# Patient Record
Sex: FEMALE | Race: WHITE | NOT HISPANIC OR LATINO | Employment: FULL TIME | URBAN - METROPOLITAN AREA
[De-identification: names, ages, dates, MRNs, and addresses within clinical notes are randomized per-mention and may not be internally consistent; named-entity substitution may affect disease eponyms.]

---

## 2024-05-03 ENCOUNTER — PATIENT MESSAGE (OUTPATIENT)
Dept: ADMINISTRATIVE | Facility: OTHER | Age: 29
End: 2024-05-03
Payer: COMMERCIAL

## 2024-07-11 ENCOUNTER — TELEPHONE (OUTPATIENT)
Dept: OBSTETRICS AND GYNECOLOGY | Facility: CLINIC | Age: 29
End: 2024-07-11
Payer: COMMERCIAL

## 2024-07-11 NOTE — TELEPHONE ENCOUNTER
Spoke with patient. Assisted patient to schedule appointment. Patient states she will call back. Patient verbalized understanding.

## 2024-07-24 ENCOUNTER — OFFICE VISIT (OUTPATIENT)
Dept: OBSTETRICS AND GYNECOLOGY | Facility: CLINIC | Age: 29
End: 2024-07-24
Payer: COMMERCIAL

## 2024-07-24 VITALS
WEIGHT: 148.38 LBS | BODY MASS INDEX: 22.49 KG/M2 | HEIGHT: 68 IN | DIASTOLIC BLOOD PRESSURE: 70 MMHG | SYSTOLIC BLOOD PRESSURE: 115 MMHG

## 2024-07-24 DIAGNOSIS — Z34.00 SUPERVISION OF NORMAL FIRST PREGNANCY, ANTEPARTUM: ICD-10-CM

## 2024-07-24 DIAGNOSIS — Z3A.28 28 WEEKS GESTATION OF PREGNANCY: Primary | ICD-10-CM

## 2024-07-24 PROCEDURE — 99999 PR PBB SHADOW E&M-EST. PATIENT-LVL III: CPT | Mod: PBBFAC,,, | Performed by: ADVANCED PRACTICE MIDWIFE

## 2024-07-24 NOTE — PROGRESS NOTES
Subjective     Patient ID: Katelyn Pritchard is a 28 y.o. female.    Chief Complaint: Possible Pregnancy    Presents today to transfer care as is interested in NCB  Records, labs and ultrasounds visible in chart and reviewed.  Anatomy scan was incomplete at 19 weeks  Having a girl  Planned pregnancy. 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   Cardiovascular:  Positive for palpitations.        Is following  States only when tired or stressed  Declines Holter monitor at present   All other systems reviewed and are negative.         Objective     Physical Exam  Vitals reviewed.   Constitutional:       Appearance: Normal appearance.   Pulmonary:      Effort: Pulmonary effort is normal.   Abdominal:      Palpations: Abdomen is soft.      Comments: FH 22  FHT's 154   Genitourinary:     Comments: deferred  Musculoskeletal:         General: Normal range of motion.   Skin:     General: Skin is warm and dry.   Neurological:      General: No focal deficit present.      Mental Status: She is alert and oriented to person, place, and time. Mental status is at baseline.   Psychiatric:         Mood and Affect: Mood normal.         Behavior: Behavior normal.         Thought Content: Thought content normal.         Judgment: Judgment normal.            Assessment and Plan     1. 28 weeks gestation of pregnancy  -     CBC Auto Differential; Future; Expected date: 07/24/2024  -     Treponema Pallidium Antibodies IgG, IgM; Future; Expected date: 07/24/2024  -     HIV 1/2 Ag/Ab (4th Gen); Future; Expected date: 07/24/2024  -     OB Glucose Screen; Future; Expected date: 07/24/2024  -     US OB/GYN Procedure ([facility])-Future; Future    2. Supervision of normal first pregnancy, antepartum      RTC 4 weeks with T3 labs  Warning S/S reviewed  A-Z book and LND contact # provided  Oriented to [facility]           No follow-ups on  file.

## 2024-07-30 ENCOUNTER — TELEPHONE (OUTPATIENT)
Dept: OBSTETRICS AND GYNECOLOGY | Facility: CLINIC | Age: 29
End: 2024-07-30
Payer: COMMERCIAL

## 2024-07-30 NOTE — TELEPHONE ENCOUNTER
----- Message from Fabiola Washington sent at 7/30/2024 11:55 AM CDT -----  Contact: Hope  Patient is calling in regards to paperwork. 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 Pt needing letter sent via [employer]. For any further questions/concerns, pt can be reached at .[phone].

## 2024-08-21 ENCOUNTER — LAB VISIT (OUTPATIENT)
Dept: LAB | Facility: HOSPITAL | Age: 29
End: 2024-08-21
Attending: ADVANCED PRACTICE MIDWIFE
Payer: COMMERCIAL

## 2024-08-21 ENCOUNTER — PROCEDURE VISIT (OUTPATIENT)
Dept: OBSTETRICS AND GYNECOLOGY | Facility: CLINIC | Age: 29
End: 2024-08-21
Payer: COMMERCIAL

## 2024-08-21 ENCOUNTER — ROUTINE PRENATAL (OUTPATIENT)
Dept: OBSTETRICS AND GYNECOLOGY | Facility: CLINIC | Age: 29
End: 2024-08-21
Payer: COMMERCIAL

## 2024-08-21 VITALS — WEIGHT: 153 LBS | BODY MASS INDEX: 23.26 KG/M2 | SYSTOLIC BLOOD PRESSURE: 110 MMHG | DIASTOLIC BLOOD PRESSURE: 70 MMHG

## 2024-08-21 DIAGNOSIS — Z3A.28 28 WEEKS GESTATION OF PREGNANCY: ICD-10-CM

## 2024-08-21 DIAGNOSIS — Z3A.27 27 WEEKS GESTATION OF PREGNANCY: ICD-10-CM

## 2024-08-21 DIAGNOSIS — Z34.00 SUPERVISION OF NORMAL FIRST PREGNANCY, ANTEPARTUM: Primary | ICD-10-CM

## 2024-08-21 LAB
BASOPHILS # BLD AUTO: 0.09 K/UL (ref 0–0.2)
BASOPHILS NFR BLD: 1 % (ref 0–1.9)
DIFFERENTIAL METHOD BLD: ABNORMAL
EOSINOPHIL # BLD AUTO: 0.3 K/UL (ref 0–0.5)
EOSINOPHIL NFR BLD: 2.9 % (ref 0–8)
ERYTHROCYTE [DISTWIDTH] IN BLOOD BY AUTOMATED COUNT: 12.5 % (ref 11.5–14.5)
GLUCOSE SERPL-MCNC: 68 MG/DL (ref 70–140)
HCT VFR BLD AUTO: 39.5 % (ref 37–48.5)
HGB BLD-MCNC: 12.9 G/DL (ref 12–16)
HIV 1+2 AB+HIV1 P24 AG SERPL QL IA: NORMAL
IMM GRANULOCYTES # BLD AUTO: 0.03 K/UL (ref 0–0.04)
IMM GRANULOCYTES NFR BLD AUTO: 0.3 % (ref 0–0.5)
LYMPHOCYTES # BLD AUTO: 1.3 K/UL (ref 1–4.8)
LYMPHOCYTES NFR BLD: 14.6 % (ref 18–48)
MCH RBC QN AUTO: 33 PG (ref 27–31)
MCHC RBC AUTO-ENTMCNC: 32.7 G/DL (ref 32–36)
MCV RBC AUTO: 101 FL (ref 82–98)
MONOCYTES # BLD AUTO: 0.6 K/UL (ref 0.3–1)
MONOCYTES NFR BLD: 6.9 % (ref 4–15)
NEUTROPHILS # BLD AUTO: 6.6 K/UL (ref 1.8–7.7)
NEUTROPHILS NFR BLD: 74.3 % (ref 38–73)
NRBC BLD-RTO: 0 /100 WBC
PLATELET # BLD AUTO: 236 K/UL (ref 150–450)
PMV BLD AUTO: 11.6 FL (ref 9.2–12.9)
RBC # BLD AUTO: 3.91 M/UL (ref 4–5.4)
TREPONEMA PALLIDUM IGG+IGM AB [PRESENCE] IN SERUM OR PLASMA BY IMMUNOASSAY: NONREACTIVE
WBC # BLD AUTO: 8.89 K/UL (ref 3.9–12.7)

## 2024-08-21 PROCEDURE — 99999 PR PBB SHADOW E&M-EST. PATIENT-LVL III: CPT | Mod: PBBFAC,,, | Performed by: MIDWIFE

## 2024-08-21 PROCEDURE — 82950 GLUCOSE TEST: CPT | Performed by: ADVANCED PRACTICE MIDWIFE

## 2024-08-21 PROCEDURE — 87389 HIV-1 AG W/HIV-1&-2 AB AG IA: CPT | Performed by: ADVANCED PRACTICE MIDWIFE

## 2024-08-21 PROCEDURE — 85025 COMPLETE CBC W/AUTO DIFF WBC: CPT | Performed by: ADVANCED PRACTICE MIDWIFE

## 2024-08-21 PROCEDURE — 36415 COLL VENOUS BLD VENIPUNCTURE: CPT | Performed by: ADVANCED PRACTICE MIDWIFE

## 2024-08-21 PROCEDURE — 0502F SUBSEQUENT PRENATAL CARE: CPT | Mod: CPTII,S$GLB,, | Performed by: MIDWIFE

## 2024-08-21 PROCEDURE — 86593 SYPHILIS TEST NON-TREP QUANT: CPT | Performed by: ADVANCED PRACTICE MIDWIFE

## 2024-08-21 NOTE — PROGRESS NOTES
28 y.o. female  at 27w2d   Reports + FM, denies VB, LOF or CTX  Doing well without concerns, had some questions about cramping, warning signs discussed, reviewed kick counts  TW lbs   28wk labs today (A) pos  Tdap info given  Will start visits every 2 weeks  Pt lives in [place], appt made w/ [facility] today for f/u anatomy, appears complete, EFW 49%, MVP 6.9, BREECH  Supervision of normal first pregnancy, antepartum    27 weeks gestation of pregnancy    Reviewed warning signs, normal FKCs,  labor precautions and how/when to call.  RTC x 2 wks, call or present sooner prn.

## 2024-08-21 NOTE — PATIENT INSTRUCTIONS
"  Frequently Asked Questions for Pregnant Women Concerning Tdap Vaccination    What is pertussis (whooping cough)?  Pertussis (also called whooping cough) is a highly contagious disease that causes severe coughing. People with pertussis may make a "whooping" sound when they try to breathe and gasp for air. In newborns (birth to 1 month), pertussis can be life threatening. Recent outbreaks have shown that infants younger than 3 months are at very high risk of severe infection.     What is Tdap?  The tetanus toxoid, reduced diphtheria toxoid, and acellular pertussis (Tdap) vaccine is used to prevent three infections: 1) tetanus, 2) diptheria, and 3) pertussis.    I am pregnant. Should I get a Tdap shot?  Yes. All pregnant women should get a Tdap shot in the 3rd trimester, preferably between 27 weeks and 36 weeks of pregnancy. The Tdap shot is an effective and safe way to protect you and your baby from serious illness and complications of pertussis. You should get a Tdap shot during each pregnancy.    Is it safe to get the Tdap shot during pregnancy?  Yes. There are no theoretical or proven concerns about the safety of the Tdap vaccine (or other inactivated vaccines like Tdap) during pregnancy. The shot is safe when given to pregnant women.     During which trimester is it safe to get a Tdap shot?  It is safe to get the Tdap shot during all three trimesters of pregnancy. Experts recommend that you get Tdap during the 3rd trimester (preferably between 27 weeks and 36 weeks of pregnancy). This gives your  the most protection. The shot causes you to make antibodies against pertussis. These antibiotics are passed to the fetus. They protect your  until he or she begins to get vaccines against pertussis at 2 months of age.    Can newborns be vaccinated against pertussis?  No. Newborns cannot start their vaccine series against pertussis until they are 2 months of age because the vaccine does not work in the " "first few weeks of life. This is partly why newborns are at a higher risk of getting pertussis and becoming very ill.    What else can I do to protect my baby against pertussis?  Getting your Tdap shot is the most important step in protecting yourself and your baby against pertussis. It also is important that all family members and caregivers are up-to-date with their vaccines. If they need the Tdap shot, they should get it at least 2 weeks before having contact with your . This makes a safety "cocoon" of vaccinated caregivers around your baby.    I am breastfeeding my baby. Is it safe to get the Tdap vaccine?  Yes. The Tdap shot can safely be given to breastfeeding women if they did not get the Tdap shot during pregnancy and have never received the Tdap shot before.    I did not get my Tdap shot during pregnancy. Do I still need to get the vaccine?  If you have never gotten the Tdap vaccine and you do not get the shot during pregnancy, be sure to get the vaccine right after you give birth, before you leave the hospital or birthing center. It will take about 2 weeks for your body to make protective antibodies in response to the vaccine. Once these antibodies are made, you are likely to give pertussis to your . But remember, your baby still will be at risk of catching whooping cough from others.    I got a Tdap shot during a past pregnancy. Do I need to get the shot again during this pregnancy?  Yes. All pregnant women should get a Tdap shot during each pregnancy, preferably between 27 weeks and 36 weeks of pregnancy. This time frame is recommended because it gives the most protection to the pregnant woman and the fetus. It appears to maximize the antibodies present in the  at birth.    I received a Tdap shot early in this pregnancy, before 27-36 weeks of pregnancy. Do I need to get another Tdap shot between 27 weeks and 36 weeks of pregnancy?  A pregnant woman does not need to get the Tdap shot " later in the same pregnancy if she got the shot in the first or second trimester.     Can I get the Tdap vaccine and flu vaccine at the same time?  Yes. You can get more than one vaccine in the same visit.    What is the difference between Tdap, Td, and DTaP?  Children receive the diphtheria and tetanus toxoids and acellular pertussis (DTaP) vaccine. Teenagers and adults are given the Tdap vaccine as a booster to the DTaP they got as children. Adults receive the tetanus and diphtheria (Td) vaccine every 10 years to protect against tetanus and diphtheria. Td does not protect against pertussis.     RESOURCES  www.[employer].org  www.immunizationforwomen.org  https://www.cdc.gov/vaccines/pregnancy/index.html  [email or web address].org

## 2024-08-22 PROBLEM — O44.40 LOW-LYING PLACENTA: Status: ACTIVE | Noted: 2024-08-22

## 2024-08-26 ENCOUNTER — PATIENT MESSAGE (OUTPATIENT)
Dept: OTHER | Facility: OTHER | Age: 29
End: 2024-08-26
Payer: COMMERCIAL

## 2024-09-06 ENCOUNTER — ROUTINE PRENATAL (OUTPATIENT)
Dept: OBSTETRICS AND GYNECOLOGY | Facility: CLINIC | Age: 29
End: 2024-09-06
Payer: COMMERCIAL

## 2024-09-06 VITALS
SYSTOLIC BLOOD PRESSURE: 108 MMHG | DIASTOLIC BLOOD PRESSURE: 70 MMHG | WEIGHT: 155.56 LBS | BODY MASS INDEX: 23.65 KG/M2

## 2024-09-06 DIAGNOSIS — O44.40 LOW-LYING PLACENTA: ICD-10-CM

## 2024-09-06 DIAGNOSIS — Z34.00 SUPERVISION OF NORMAL FIRST PREGNANCY, ANTEPARTUM: Primary | ICD-10-CM

## 2024-09-06 PROCEDURE — 0502F SUBSEQUENT PRENATAL CARE: CPT | Mod: CPTII,S$GLB,, | Performed by: MIDWIFE

## 2024-09-06 PROCEDURE — 99999 PR PBB SHADOW E&M-EST. PATIENT-LVL II: CPT | Mod: PBBFAC,,, | Performed by: MIDWIFE

## 2024-09-06 NOTE — PROGRESS NOTES
28 y.o. female  at 29w4d  Reports + FM, denies VB, LOF or CTX  Doing well without concerns.   TW.5 lbs     Low-lying placenta  -   repeat US at 32w    Supervision of normal first pregnancy, antepartum  -routine PNC  -3rd trimester labs reviewed and wnl (A pos)   -plans NCB, taking classes at birth center   -declines Tdap     Reviewed warning signs, normal FKCs,  labor precautions and how/when to call.  RTC x 3wks, call or present sooner prn.

## 2024-09-09 ENCOUNTER — PATIENT MESSAGE (OUTPATIENT)
Dept: OTHER | Facility: OTHER | Age: 29
End: 2024-09-09
Payer: COMMERCIAL

## 2024-09-20 ENCOUNTER — PATIENT MESSAGE (OUTPATIENT)
Dept: OBSTETRICS AND GYNECOLOGY | Facility: CLINIC | Age: 29
End: 2024-09-20
Payer: COMMERCIAL

## 2024-09-23 ENCOUNTER — PATIENT MESSAGE (OUTPATIENT)
Dept: OTHER | Facility: OTHER | Age: 29
End: 2024-09-23
Payer: COMMERCIAL

## 2024-09-27 ENCOUNTER — ROUTINE PRENATAL (OUTPATIENT)
Dept: OBSTETRICS AND GYNECOLOGY | Facility: CLINIC | Age: 29
End: 2024-09-27
Payer: COMMERCIAL

## 2024-09-27 ENCOUNTER — PROCEDURE VISIT (OUTPATIENT)
Dept: OBSTETRICS AND GYNECOLOGY | Facility: CLINIC | Age: 29
End: 2024-09-27
Payer: COMMERCIAL

## 2024-09-27 VITALS
SYSTOLIC BLOOD PRESSURE: 110 MMHG | WEIGHT: 160.63 LBS | BODY MASS INDEX: 24.42 KG/M2 | DIASTOLIC BLOOD PRESSURE: 70 MMHG

## 2024-09-27 DIAGNOSIS — O44.40 LOW-LYING PLACENTA: ICD-10-CM

## 2024-09-27 DIAGNOSIS — Z34.00 SUPERVISION OF NORMAL FIRST PREGNANCY, ANTEPARTUM: Primary | ICD-10-CM

## 2024-09-27 PROCEDURE — 0502F SUBSEQUENT PRENATAL CARE: CPT | Mod: CPTII,S$GLB,, | Performed by: MIDWIFE

## 2024-09-27 PROCEDURE — 76816 OB US FOLLOW-UP PER FETUS: CPT | Mod: S$GLB,,, | Performed by: OBSTETRICS & GYNECOLOGY

## 2024-09-27 PROCEDURE — 99999 PR PBB SHADOW E&M-EST. PATIENT-LVL II: CPT | Mod: PBBFAC,,, | Performed by: MIDWIFE

## 2024-10-01 NOTE — PROGRESS NOTES
28 y.o. female  at 32w4d  Reports + FM, denies VB, LOF or CTX  Doing well without concerns.   Declines TV US today, would prefer to wait until 36w  TW.5 lbs      Low-lying placenta  -   declines TV US today, would prefer to wait until 36w to check low lying placenta  - pt understands importance of determining placental locations for attempting labor and risk of bleeding if placenta remains low lying         - placenta posterior today, cannot rule out low lying or not     Supervision of normal first pregnancy, antepartum  -routine PNC  -3rd trimester labs reviewed and wnl (A pos)   -plans NCB, taking classes at birth center   - growth US today cephalic presentation, MVP 5.6cm, EFW 4lb 9oz, 31%ile, AC 64%ile     Reviewed warning signs, normal FKCs,  labor precautions and how/when to call.  RTC x 2 wks, call or present sooner prn.

## 2024-10-04 ENCOUNTER — PATIENT MESSAGE (OUTPATIENT)
Dept: OBSTETRICS AND GYNECOLOGY | Facility: CLINIC | Age: 29
End: 2024-10-04
Payer: COMMERCIAL

## 2024-10-14 ENCOUNTER — PATIENT MESSAGE (OUTPATIENT)
Dept: OTHER | Facility: OTHER | Age: 29
End: 2024-10-14
Payer: COMMERCIAL

## 2024-10-16 ENCOUNTER — ROUTINE PRENATAL (OUTPATIENT)
Dept: OBSTETRICS AND GYNECOLOGY | Facility: CLINIC | Age: 29
End: 2024-10-16
Payer: COMMERCIAL

## 2024-10-16 VITALS
BODY MASS INDEX: 24.97 KG/M2 | WEIGHT: 164.25 LBS | SYSTOLIC BLOOD PRESSURE: 112 MMHG | DIASTOLIC BLOOD PRESSURE: 76 MMHG

## 2024-10-16 DIAGNOSIS — O44.40 LOW-LYING PLACENTA: Primary | ICD-10-CM

## 2024-10-16 DIAGNOSIS — Z34.00 SUPERVISION OF NORMAL FIRST PREGNANCY, ANTEPARTUM: ICD-10-CM

## 2024-10-16 PROCEDURE — 0502F SUBSEQUENT PRENATAL CARE: CPT | Mod: CPTII,S$GLB,, | Performed by: MIDWIFE

## 2024-10-16 PROCEDURE — 99999 PR PBB SHADOW E&M-EST. PATIENT-LVL II: CPT | Mod: PBBFAC,,, | Performed by: MIDWIFE

## 2024-10-16 NOTE — PROGRESS NOTES
28 y.o. female  at 35w2d  Reports + FM, denies VB, LOF or CTX  Doing well without concerns. Voices no complaints.   TW lbs      Low-lying placenta  -  TVUS next week for r/o low lying and growth scan      Supervision of normal first pregnancy, antepartum  -routine PNC  -plans NCB, taking classes at birth center, has Nito Veliz for   -GBS collection reviewed, will collect nv at 36w     Reviewed warning signs, normal FKCs,  labor precautions and how/when to call.  RTC x 1 wk, call or present sooner prn.                      [Use of Plain Language] : use of plain language [Adequate] : adequate [None] : none

## 2024-10-17 ENCOUNTER — PATIENT MESSAGE (OUTPATIENT)
Dept: OBSTETRICS AND GYNECOLOGY | Facility: CLINIC | Age: 29
End: 2024-10-17
Payer: COMMERCIAL

## 2024-10-17 ENCOUNTER — TELEPHONE (OUTPATIENT)
Dept: OBSTETRICS AND GYNECOLOGY | Facility: CLINIC | Age: 29
End: 2024-10-17
Payer: COMMERCIAL

## 2024-10-17 NOTE — TELEPHONE ENCOUNTER
Patients and husbands fmla completed.  Both scanned to chart and copy sent to patient.  Husbands fmla faxed to [phone].  Awaiting patient to let me know where she wants hers sent to.

## 2024-10-21 ENCOUNTER — ROUTINE PRENATAL (OUTPATIENT)
Dept: OBSTETRICS AND GYNECOLOGY | Facility: CLINIC | Age: 29
End: 2024-10-21
Payer: COMMERCIAL

## 2024-10-21 ENCOUNTER — PROCEDURE VISIT (OUTPATIENT)
Dept: OBSTETRICS AND GYNECOLOGY | Facility: CLINIC | Age: 29
End: 2024-10-21
Payer: COMMERCIAL

## 2024-10-21 VITALS
DIASTOLIC BLOOD PRESSURE: 76 MMHG | SYSTOLIC BLOOD PRESSURE: 118 MMHG | BODY MASS INDEX: 24.77 KG/M2 | WEIGHT: 162.94 LBS

## 2024-10-21 DIAGNOSIS — O44.40 LOW-LYING PLACENTA: ICD-10-CM

## 2024-10-21 DIAGNOSIS — Z34.00 SUPERVISION OF NORMAL FIRST PREGNANCY, ANTEPARTUM: Primary | ICD-10-CM

## 2024-10-21 DIAGNOSIS — Z36.89 ENCOUNTER FOR ULTRASOUND TO ASSESS FETAL GROWTH: ICD-10-CM

## 2024-10-21 PROCEDURE — 0502F SUBSEQUENT PRENATAL CARE: CPT | Mod: CPTII,S$GLB,, | Performed by: MIDWIFE

## 2024-10-21 PROCEDURE — 87653 STREP B DNA AMP PROBE: CPT | Performed by: MIDWIFE

## 2024-10-21 PROCEDURE — 99999 PR PBB SHADOW E&M-EST. PATIENT-LVL II: CPT | Mod: PBBFAC,,, | Performed by: MIDWIFE

## 2024-10-21 NOTE — PROGRESS NOTES
28 y.o. female  at 36w0d  Reports + FM, denies VB, LOF or CTX  Doing well without concerns. Voices no complaints.   TW lbs   Last day of work 24     Low-lying placenta  -  RESOLVED   - per TVUS appears to be posterior and wnl.      Supervision of normal first pregnancy, antepartum  -routine PNC  -GBS collected today. The skin of the suprapubic region appears clean, dry, and intact. Growth us not performed today. Will schedule for US with next available in [place].      Reviewed warning signs, normal FKCs,  labor precautions and how/when to call.  RTC x 1 wk, call or present sooner prn.

## 2024-10-23 LAB — GROUP B STREPTOCOCCUS, PCR: NEGATIVE

## 2024-10-30 ENCOUNTER — ROUTINE PRENATAL (OUTPATIENT)
Dept: OBSTETRICS AND GYNECOLOGY | Facility: CLINIC | Age: 29
End: 2024-10-30
Payer: COMMERCIAL

## 2024-10-30 VITALS
SYSTOLIC BLOOD PRESSURE: 118 MMHG | BODY MASS INDEX: 25.17 KG/M2 | DIASTOLIC BLOOD PRESSURE: 82 MMHG | WEIGHT: 165.56 LBS

## 2024-10-30 DIAGNOSIS — Z34.00 SUPERVISION OF NORMAL FIRST PREGNANCY, ANTEPARTUM: Primary | ICD-10-CM

## 2024-10-30 PROCEDURE — 99999 PR PBB SHADOW E&M-EST. PATIENT-LVL II: CPT | Mod: PBBFAC,,, | Performed by: MIDWIFE

## 2024-10-30 PROCEDURE — 0502F SUBSEQUENT PRENATAL CARE: CPT | Mod: CPTII,S$GLB,, | Performed by: MIDWIFE

## 2024-11-05 ENCOUNTER — PROCEDURE VISIT (OUTPATIENT)
Dept: OBSTETRICS AND GYNECOLOGY | Facility: CLINIC | Age: 29
End: 2024-11-05
Payer: COMMERCIAL

## 2024-11-05 ENCOUNTER — ROUTINE PRENATAL (OUTPATIENT)
Dept: OBSTETRICS AND GYNECOLOGY | Facility: CLINIC | Age: 29
End: 2024-11-05
Payer: COMMERCIAL

## 2024-11-05 VITALS — SYSTOLIC BLOOD PRESSURE: 112 MMHG | WEIGHT: 164 LBS | BODY MASS INDEX: 24.94 KG/M2 | DIASTOLIC BLOOD PRESSURE: 76 MMHG

## 2024-11-05 DIAGNOSIS — Z34.00 SUPERVISION OF NORMAL FIRST PREGNANCY, ANTEPARTUM: Primary | ICD-10-CM

## 2024-11-05 DIAGNOSIS — Z36.89 ENCOUNTER FOR ULTRASOUND TO ASSESS FETAL GROWTH: ICD-10-CM

## 2024-11-05 PROCEDURE — 0502F SUBSEQUENT PRENATAL CARE: CPT | Mod: CPTII,S$GLB,, | Performed by: MIDWIFE

## 2024-11-05 PROCEDURE — 99999 PR PBB SHADOW E&M-EST. PATIENT-LVL II: CPT | Mod: PBBFAC,,, | Performed by: MIDWIFE

## 2024-11-05 PROCEDURE — 76816 OB US FOLLOW-UP PER FETUS: CPT | Mod: S$GLB,,, | Performed by: OBSTETRICS & GYNECOLOGY

## 2024-11-05 NOTE — PROGRESS NOTES
28 y.o. female  at 38w1d  Reports + FM, denies VB, LOF or CTX  Doing well without concerns, requesting to begin maternity leave today. Letter given for work.   Also starting to have some BHCs, but they resolve with rest.   TW lbs      Supervision of normal first pregnancy, antepartum  -routine PNC  -GBS negative  -declines VE  -Growth scan today, cephalic presentation, MVP 6.3cm, EFW 7lb 11oz, 68%ile, AC 91%ile      Reviewed warning signs, normal FKCs, labor precautions and how/when to call.  RTC x 1 wk, call or present sooner prn.

## 2024-11-11 ENCOUNTER — ROUTINE PRENATAL (OUTPATIENT)
Dept: OBSTETRICS AND GYNECOLOGY | Facility: CLINIC | Age: 29
End: 2024-11-11
Payer: COMMERCIAL

## 2024-11-11 VITALS
DIASTOLIC BLOOD PRESSURE: 74 MMHG | WEIGHT: 164.69 LBS | BODY MASS INDEX: 25.04 KG/M2 | SYSTOLIC BLOOD PRESSURE: 102 MMHG

## 2024-11-11 DIAGNOSIS — Z34.00 SUPERVISION OF NORMAL FIRST PREGNANCY, ANTEPARTUM: Primary | ICD-10-CM

## 2024-11-11 PROCEDURE — 99999 PR PBB SHADOW E&M-EST. PATIENT-LVL II: CPT | Mod: PBBFAC,,, | Performed by: MIDWIFE

## 2024-11-11 PROCEDURE — 0502F SUBSEQUENT PRENATAL CARE: CPT | Mod: CPTII,S$GLB,, | Performed by: MIDWIFE

## 2024-11-11 NOTE — PROGRESS NOTES
28 y.o. female  at 39w0d  Normal FM, denies VB, LOF or CTX  Doing well, voices no complaints today   TW lbs      Supervision of normal first pregnancy, antepartum  -routine PNC  -GBS negative  -declines VE     Reviewed warning signs, normal FKCs, labor precautions and how/when to call.  RTC x 1 wk, call or present sooner prn.

## 2024-11-18 ENCOUNTER — ROUTINE PRENATAL (OUTPATIENT)
Dept: OBSTETRICS AND GYNECOLOGY | Facility: CLINIC | Age: 29
End: 2024-11-18
Payer: COMMERCIAL

## 2024-11-18 VITALS
SYSTOLIC BLOOD PRESSURE: 126 MMHG | DIASTOLIC BLOOD PRESSURE: 83 MMHG | WEIGHT: 165.13 LBS | BODY MASS INDEX: 25.11 KG/M2

## 2024-11-18 DIAGNOSIS — Z34.00 SUPERVISION OF NORMAL FIRST PREGNANCY, ANTEPARTUM: Primary | ICD-10-CM

## 2024-11-18 PROCEDURE — 99999 PR PBB SHADOW E&M-EST. PATIENT-LVL II: CPT | Mod: PBBFAC,,, | Performed by: MIDWIFE

## 2024-11-18 PROCEDURE — 0502F SUBSEQUENT PRENATAL CARE: CPT | Mod: CPTII,S$GLB,, | Performed by: MIDWIFE

## 2024-11-18 NOTE — PROGRESS NOTES
29 y.o. female  at 40w0d  Normal FM, denies VB, LOF or CTX  Doing well, voices no complaints today. Having some episodes of BHCs.  keith Ramey, here with her today   TW lbs      Supervision of normal first pregnancy, antepartum  -routine PNC  -GBS negative  -requests VE today, cervix soft. Continue activities to encourage labor. Declines sweep but open to it possibly next week if still pregnant.      Reviewed warning signs, normal FKCs, labor precautions and how/when to call.  RTC x 1 wk, call or present sooner prn.

## 2024-11-20 ENCOUNTER — HOSPITAL ENCOUNTER (INPATIENT)
Facility: HOSPITAL | Age: 29
LOS: 3 days | Discharge: HOME | End: 2024-11-23
Attending: OBSTETRICS & GYNECOLOGY | Admitting: OBSTETRICS & GYNECOLOGY
Payer: COMMERCIAL

## 2024-11-20 DIAGNOSIS — Z37.9 NORMAL LABOR: ICD-10-CM

## 2024-11-20 PROCEDURE — 10907ZC DRAINAGE OF AMNIOTIC FLUID, THERAPEUTIC FROM PRODUCTS OF CONCEPTION, VIA NATURAL OR ARTIFICIAL OPENING: ICD-10-PCS | Performed by: ADVANCED PRACTICE MIDWIFE

## 2024-11-20 PROCEDURE — 11000001 HC ACUTE MED/SURG PRIVATE ROOM

## 2024-11-20 PROCEDURE — 72100002 HC LABOR CARE, 1ST 8 HOURS

## 2024-11-20 PROCEDURE — 99211 OFF/OP EST MAY X REQ PHY/QHP: CPT

## 2024-11-20 RX ORDER — SODIUM CHLORIDE, SODIUM LACTATE, POTASSIUM CHLORIDE, CALCIUM CHLORIDE 600; 310; 30; 20 MG/100ML; MG/100ML; MG/100ML; MG/100ML
INJECTION, SOLUTION INTRAVENOUS CONTINUOUS
Status: DISCONTINUED | OUTPATIENT
Start: 2024-11-20 | End: 2024-11-21

## 2024-11-20 RX ORDER — SIMETHICONE 80 MG
1 TABLET,CHEWABLE ORAL 4 TIMES DAILY PRN
Status: DISCONTINUED | OUTPATIENT
Start: 2024-11-20 | End: 2024-11-21

## 2024-11-20 RX ORDER — OXYTOCIN/0.9 % SODIUM CHLORIDE 15/250 ML
95 PLASTIC BAG, INJECTION (ML) INTRAVENOUS CONTINUOUS PRN
Status: DISCONTINUED | OUTPATIENT
Start: 2024-11-20 | End: 2024-11-21

## 2024-11-20 RX ORDER — DIPHENOXYLATE HYDROCHLORIDE AND ATROPINE SULFATE 2.5; .025 MG/1; MG/1
2 TABLET ORAL EVERY 6 HOURS PRN
Status: DISCONTINUED | OUTPATIENT
Start: 2024-11-20 | End: 2024-11-21

## 2024-11-20 RX ORDER — CALCIUM CARBONATE 200(500)MG
500 TABLET,CHEWABLE ORAL 3 TIMES DAILY PRN
Status: DISCONTINUED | OUTPATIENT
Start: 2024-11-20 | End: 2024-11-21

## 2024-11-20 RX ORDER — MISOPROSTOL 200 UG/1
800 TABLET ORAL ONCE AS NEEDED
Status: DISCONTINUED | OUTPATIENT
Start: 2024-11-20 | End: 2024-11-21

## 2024-11-20 RX ORDER — OXYTOCIN-SODIUM CHLORIDE 0.9% IV SOLN 30 UNIT/500ML 30-0.9/5 UT/ML-%
30 SOLUTION INTRAVENOUS ONCE AS NEEDED
Status: DISCONTINUED | OUTPATIENT
Start: 2024-11-20 | End: 2024-11-21

## 2024-11-20 RX ORDER — ONDANSETRON 8 MG/1
8 TABLET, ORALLY DISINTEGRATING ORAL EVERY 8 HOURS PRN
Status: DISCONTINUED | OUTPATIENT
Start: 2024-11-20 | End: 2024-11-21

## 2024-11-20 RX ORDER — CARBOPROST TROMETHAMINE 250 UG/ML
250 INJECTION, SOLUTION INTRAMUSCULAR
Status: DISCONTINUED | OUTPATIENT
Start: 2024-11-20 | End: 2024-11-21

## 2024-11-20 RX ORDER — OXYTOCIN 10 [USP'U]/ML
10 INJECTION, SOLUTION INTRAMUSCULAR; INTRAVENOUS ONCE AS NEEDED
Status: DISCONTINUED | OUTPATIENT
Start: 2024-11-20 | End: 2024-11-21

## 2024-11-20 RX ORDER — OXYTOCIN-SODIUM CHLORIDE 0.9% IV SOLN 30 UNIT/500ML 30-0.9/5 UT/ML-%
10 SOLUTION INTRAVENOUS ONCE AS NEEDED
Status: DISCONTINUED | OUTPATIENT
Start: 2024-11-20 | End: 2024-11-21

## 2024-11-20 RX ORDER — OXYTOCIN/0.9 % SODIUM CHLORIDE 15/250 ML
95 PLASTIC BAG, INJECTION (ML) INTRAVENOUS ONCE AS NEEDED
Status: DISCONTINUED | OUTPATIENT
Start: 2024-11-20 | End: 2024-11-21

## 2024-11-20 RX ORDER — LIDOCAINE HYDROCHLORIDE 10 MG/ML
10 INJECTION, SOLUTION EPIDURAL; INFILTRATION; INTRACAUDAL; PERINEURAL ONCE AS NEEDED
Status: DISCONTINUED | OUTPATIENT
Start: 2024-11-20 | End: 2024-11-21

## 2024-11-20 RX ORDER — TRANEXAMIC ACID 10 MG/ML
1000 INJECTION, SOLUTION INTRAVENOUS EVERY 30 MIN PRN
Status: DISCONTINUED | OUTPATIENT
Start: 2024-11-20 | End: 2024-11-21

## 2024-11-20 RX ORDER — METHYLERGONOVINE MALEATE 0.2 MG/ML
200 INJECTION INTRAVENOUS ONCE AS NEEDED
Status: DISCONTINUED | OUTPATIENT
Start: 2024-11-20 | End: 2024-11-21

## 2024-11-21 ENCOUNTER — ANESTHESIA EVENT (OUTPATIENT)
Dept: OBSTETRICS AND GYNECOLOGY | Facility: HOSPITAL | Age: 29
End: 2024-11-21
Payer: COMMERCIAL

## 2024-11-21 ENCOUNTER — ANESTHESIA (OUTPATIENT)
Dept: OBSTETRICS AND GYNECOLOGY | Facility: HOSPITAL | Age: 29
End: 2024-11-21
Payer: COMMERCIAL

## 2024-11-21 LAB
ABO + RH BLD: NORMAL
BASOPHILS # BLD AUTO: 0.02 K/UL (ref 0–0.2)
BASOPHILS # BLD AUTO: 0.04 K/UL (ref 0–0.2)
BASOPHILS # BLD AUTO: 0.04 K/UL (ref 0–0.2)
BASOPHILS # BLD AUTO: 0.06 K/UL (ref 0–0.2)
BASOPHILS NFR BLD: 0.2 % (ref 0–1.9)
BASOPHILS NFR BLD: 0.3 % (ref 0–1.9)
BASOPHILS NFR BLD: 0.4 % (ref 0–1.9)
BASOPHILS NFR BLD: 0.4 % (ref 0–1.9)
BLD GP AB SCN CELLS X3 SERPL QL: NORMAL
BLD PROD TYP BPU: NORMAL
BLOOD UNIT EXPIRATION DATE: NORMAL
BLOOD UNIT TYPE CODE: 6200
BLOOD UNIT TYPE: NORMAL
CODING SYSTEM: NORMAL
CROSSMATCH INTERPRETATION: NORMAL
DIFFERENTIAL METHOD BLD: ABNORMAL
DISPENSE STATUS: NORMAL
EOSINOPHIL # BLD AUTO: 0 K/UL (ref 0–0.5)
EOSINOPHIL # BLD AUTO: 0 K/UL (ref 0–0.5)
EOSINOPHIL # BLD AUTO: 0.1 K/UL (ref 0–0.5)
EOSINOPHIL # BLD AUTO: 0.1 K/UL (ref 0–0.5)
EOSINOPHIL NFR BLD: 0.1 % (ref 0–8)
EOSINOPHIL NFR BLD: 0.1 % (ref 0–8)
EOSINOPHIL NFR BLD: 0.4 % (ref 0–8)
EOSINOPHIL NFR BLD: 0.6 % (ref 0–8)
ERYTHROCYTE [DISTWIDTH] IN BLOOD BY AUTOMATED COUNT: 12 % (ref 11.5–14.5)
ERYTHROCYTE [DISTWIDTH] IN BLOOD BY AUTOMATED COUNT: 12.1 % (ref 11.5–14.5)
ERYTHROCYTE [DISTWIDTH] IN BLOOD BY AUTOMATED COUNT: 12.1 % (ref 11.5–14.5)
ERYTHROCYTE [DISTWIDTH] IN BLOOD BY AUTOMATED COUNT: 12.2 % (ref 11.5–14.5)
HCT VFR BLD AUTO: 24.7 % (ref 37–48.5)
HCT VFR BLD AUTO: 25.5 % (ref 37–48.5)
HCT VFR BLD AUTO: 33 % (ref 37–48.5)
HCT VFR BLD AUTO: 40.4 % (ref 37–48.5)
HGB BLD-MCNC: 11.5 G/DL (ref 12–16)
HGB BLD-MCNC: 14.2 G/DL (ref 12–16)
HGB BLD-MCNC: 8.5 G/DL (ref 12–16)
HGB BLD-MCNC: 8.7 G/DL (ref 12–16)
HIV 1+2 AB+HIV1 P24 AG SERPL QL IA: NEGATIVE
IMM GRANULOCYTES # BLD AUTO: 0.04 K/UL (ref 0–0.04)
IMM GRANULOCYTES # BLD AUTO: 0.05 K/UL (ref 0–0.04)
IMM GRANULOCYTES # BLD AUTO: 0.06 K/UL (ref 0–0.04)
IMM GRANULOCYTES # BLD AUTO: 0.07 K/UL (ref 0–0.04)
IMM GRANULOCYTES NFR BLD AUTO: 0.4 % (ref 0–0.5)
IMM GRANULOCYTES NFR BLD AUTO: 0.5 % (ref 0–0.5)
LYMPHOCYTES # BLD AUTO: 0.8 K/UL (ref 1–4.8)
LYMPHOCYTES # BLD AUTO: 0.9 K/UL (ref 1–4.8)
LYMPHOCYTES # BLD AUTO: 1.3 K/UL (ref 1–4.8)
LYMPHOCYTES # BLD AUTO: 1.4 K/UL (ref 1–4.8)
LYMPHOCYTES NFR BLD: 12.4 % (ref 18–48)
LYMPHOCYTES NFR BLD: 5.3 % (ref 18–48)
LYMPHOCYTES NFR BLD: 8 % (ref 18–48)
LYMPHOCYTES NFR BLD: 9.8 % (ref 18–48)
MCH RBC QN AUTO: 33.2 PG (ref 27–31)
MCH RBC QN AUTO: 33.3 PG (ref 27–31)
MCH RBC QN AUTO: 33.5 PG (ref 27–31)
MCH RBC QN AUTO: 33.7 PG (ref 27–31)
MCHC RBC AUTO-ENTMCNC: 34.1 G/DL (ref 32–36)
MCHC RBC AUTO-ENTMCNC: 34.4 G/DL (ref 32–36)
MCHC RBC AUTO-ENTMCNC: 34.8 G/DL (ref 32–36)
MCHC RBC AUTO-ENTMCNC: 35.1 G/DL (ref 32–36)
MCV RBC AUTO: 95 FL (ref 82–98)
MCV RBC AUTO: 95 FL (ref 82–98)
MCV RBC AUTO: 98 FL (ref 82–98)
MCV RBC AUTO: 98 FL (ref 82–98)
MONOCYTES # BLD AUTO: 0.4 K/UL (ref 0.3–1)
MONOCYTES # BLD AUTO: 0.7 K/UL (ref 0.3–1)
MONOCYTES # BLD AUTO: 0.9 K/UL (ref 0.3–1)
MONOCYTES # BLD AUTO: 0.9 K/UL (ref 0.3–1)
MONOCYTES NFR BLD: 3.9 % (ref 4–15)
MONOCYTES NFR BLD: 5.7 % (ref 4–15)
MONOCYTES NFR BLD: 6.3 % (ref 4–15)
MONOCYTES NFR BLD: 6.4 % (ref 4–15)
NEUTROPHILS # BLD AUTO: 10 K/UL (ref 1.8–7.7)
NEUTROPHILS # BLD AUTO: 11.6 K/UL (ref 1.8–7.7)
NEUTROPHILS # BLD AUTO: 12.4 K/UL (ref 1.8–7.7)
NEUTROPHILS # BLD AUTO: 8.5 K/UL (ref 1.8–7.7)
NEUTROPHILS NFR BLD: 82.3 % (ref 38–73)
NEUTROPHILS NFR BLD: 82.7 % (ref 38–73)
NEUTROPHILS NFR BLD: 85.6 % (ref 38–73)
NEUTROPHILS NFR BLD: 87.4 % (ref 38–73)
NRBC BLD-RTO: 0 /100 WBC
NUM UNITS TRANS PACKED RBC: NORMAL
PLATELET # BLD AUTO: 144 K/UL (ref 150–450)
PLATELET # BLD AUTO: 152 K/UL (ref 150–450)
PLATELET # BLD AUTO: 172 K/UL (ref 150–450)
PLATELET # BLD AUTO: 202 K/UL (ref 150–450)
PMV BLD AUTO: 11.3 FL (ref 9.2–12.9)
PMV BLD AUTO: 11.6 FL (ref 9.2–12.9)
PMV BLD AUTO: 11.7 FL (ref 9.2–12.9)
PMV BLD AUTO: 11.8 FL (ref 9.2–12.9)
RBC # BLD AUTO: 2.52 M/UL (ref 4–5.4)
RBC # BLD AUTO: 2.6 M/UL (ref 4–5.4)
RBC # BLD AUTO: 3.46 M/UL (ref 4–5.4)
RBC # BLD AUTO: 4.26 M/UL (ref 4–5.4)
WBC # BLD AUTO: 10.37 K/UL (ref 3.9–12.7)
WBC # BLD AUTO: 11.66 K/UL (ref 3.9–12.7)
WBC # BLD AUTO: 14.06 K/UL (ref 3.9–12.7)
WBC # BLD AUTO: 14.22 K/UL (ref 3.9–12.7)

## 2024-11-21 PROCEDURE — 85025 COMPLETE CBC W/AUTO DIFF WBC: CPT | Mod: 91 | Performed by: ADVANCED PRACTICE MIDWIFE

## 2024-11-21 PROCEDURE — 86900 BLOOD TYPING SEROLOGIC ABO: CPT

## 2024-11-21 PROCEDURE — 51702 INSERT TEMP BLADDER CATH: CPT

## 2024-11-21 PROCEDURE — 25000003 PHARM REV CODE 250: Performed by: ADVANCED PRACTICE MIDWIFE

## 2024-11-21 PROCEDURE — 36415 COLL VENOUS BLD VENIPUNCTURE: CPT | Performed by: OBSTETRICS & GYNECOLOGY

## 2024-11-21 PROCEDURE — 63600175 PHARM REV CODE 636 W HCPCS

## 2024-11-21 PROCEDURE — 85025 COMPLETE CBC W/AUTO DIFF WBC: CPT | Mod: 91 | Performed by: OBSTETRICS & GYNECOLOGY

## 2024-11-21 PROCEDURE — P9016 RBC LEUKOCYTES REDUCED: HCPCS | Performed by: ADVANCED PRACTICE MIDWIFE

## 2024-11-21 PROCEDURE — 86920 COMPATIBILITY TEST SPIN: CPT | Performed by: ADVANCED PRACTICE MIDWIFE

## 2024-11-21 PROCEDURE — 72200005 HC VAGINAL DELIVERY LEVEL II

## 2024-11-21 PROCEDURE — 27800516 HC TRAY, EPIDURAL COMBO: Performed by: ANESTHESIOLOGY

## 2024-11-21 PROCEDURE — 25000003 PHARM REV CODE 250

## 2024-11-21 PROCEDURE — 27201598 HC CASSETTE, BLOOD WARMER

## 2024-11-21 PROCEDURE — 62326 NJX INTERLAMINAR LMBR/SAC: CPT | Performed by: NURSE ANESTHETIST, CERTIFIED REGISTERED

## 2024-11-21 PROCEDURE — 59400 OBSTETRICAL CARE: CPT | Mod: GB,,, | Performed by: ADVANCED PRACTICE MIDWIFE

## 2024-11-21 PROCEDURE — 0KQM0ZZ REPAIR PERINEUM MUSCLE, OPEN APPROACH: ICD-10-PCS | Performed by: ADVANCED PRACTICE MIDWIFE

## 2024-11-21 PROCEDURE — 11000001 HC ACUTE MED/SURG PRIVATE ROOM

## 2024-11-21 PROCEDURE — 87389 HIV-1 AG W/HIV-1&-2 AB AG IA: CPT

## 2024-11-21 PROCEDURE — 36415 COLL VENOUS BLD VENIPUNCTURE: CPT | Performed by: ADVANCED PRACTICE MIDWIFE

## 2024-11-21 PROCEDURE — 72100003 HC LABOR CARE, EA. ADDL. 8 HRS

## 2024-11-21 PROCEDURE — 63600175 PHARM REV CODE 636 W HCPCS: Performed by: NURSE ANESTHETIST, CERTIFIED REGISTERED

## 2024-11-21 PROCEDURE — 36430 TRANSFUSION BLD/BLD COMPNT: CPT

## 2024-11-21 PROCEDURE — 27200710 HC EPIDURAL INFUSION PUMP SET: Performed by: ANESTHESIOLOGY

## 2024-11-21 PROCEDURE — 85025 COMPLETE CBC W/AUTO DIFF WBC: CPT

## 2024-11-21 RX ORDER — ROPIVACAINE HYDROCHLORIDE 2 MG/ML
14 INJECTION, SOLUTION EPIDURAL; INFILTRATION CONTINUOUS
Status: DISCONTINUED | OUTPATIENT
Start: 2024-11-21 | End: 2024-11-23 | Stop reason: HOSPADM

## 2024-11-21 RX ORDER — MISOPROSTOL 200 UG/1
800 TABLET ORAL ONCE AS NEEDED
Status: DISCONTINUED | OUTPATIENT
Start: 2024-11-21 | End: 2024-11-23 | Stop reason: HOSPADM

## 2024-11-21 RX ORDER — IBUPROFEN 600 MG/1
600 TABLET ORAL EVERY 6 HOURS
Status: DISCONTINUED | OUTPATIENT
Start: 2024-11-21 | End: 2024-11-23 | Stop reason: HOSPADM

## 2024-11-21 RX ORDER — EPHEDRINE SULFATE 50 MG/ML
50 INJECTION, SOLUTION INTRAVENOUS ONCE
Status: COMPLETED | OUTPATIENT
Start: 2024-11-21 | End: 2024-11-21

## 2024-11-21 RX ORDER — HYDROCORTISONE 25 MG/G
CREAM TOPICAL 3 TIMES DAILY PRN
Status: DISCONTINUED | OUTPATIENT
Start: 2024-11-21 | End: 2024-11-23 | Stop reason: HOSPADM

## 2024-11-21 RX ORDER — HYDROCODONE BITARTRATE AND ACETAMINOPHEN 7.5; 325 MG/1; MG/1
1 TABLET ORAL EVERY 4 HOURS PRN
Status: DISCONTINUED | OUTPATIENT
Start: 2024-11-21 | End: 2024-11-23 | Stop reason: HOSPADM

## 2024-11-21 RX ORDER — SIMETHICONE 80 MG
1 TABLET,CHEWABLE ORAL EVERY 6 HOURS PRN
Status: DISCONTINUED | OUTPATIENT
Start: 2024-11-21 | End: 2024-11-23 | Stop reason: HOSPADM

## 2024-11-21 RX ORDER — ROPIVACAINE HYDROCHLORIDE 2 MG/ML
INJECTION, SOLUTION EPIDURAL; INFILTRATION CONTINUOUS PRN
Status: DISCONTINUED | OUTPATIENT
Start: 2024-11-21 | End: 2024-11-21

## 2024-11-21 RX ORDER — DIPHENHYDRAMINE HCL 25 MG
25 CAPSULE ORAL EVERY 4 HOURS PRN
Status: DISCONTINUED | OUTPATIENT
Start: 2024-11-21 | End: 2024-11-23 | Stop reason: HOSPADM

## 2024-11-21 RX ORDER — OXYTOCIN-SODIUM CHLORIDE 0.9% IV SOLN 30 UNIT/500ML 30-0.9/5 UT/ML-%
10 SOLUTION INTRAVENOUS ONCE AS NEEDED
Status: DISCONTINUED | OUTPATIENT
Start: 2024-11-21 | End: 2024-11-23 | Stop reason: HOSPADM

## 2024-11-21 RX ORDER — PRENATAL WITH FERROUS FUM AND FOLIC ACID 3080; 920; 120; 400; 22; 1.84; 3; 20; 10; 1; 12; 200; 27; 25; 2 [IU]/1; [IU]/1; MG/1; [IU]/1; MG/1; MG/1; MG/1; MG/1; MG/1; MG/1; UG/1; MG/1; MG/1; MG/1; MG/1
1 TABLET ORAL DAILY
Status: DISCONTINUED | OUTPATIENT
Start: 2024-11-21 | End: 2024-11-23 | Stop reason: HOSPADM

## 2024-11-21 RX ORDER — OXYTOCIN/0.9 % SODIUM CHLORIDE 15/250 ML
95 PLASTIC BAG, INJECTION (ML) INTRAVENOUS ONCE AS NEEDED
Status: DISCONTINUED | OUTPATIENT
Start: 2024-11-21 | End: 2024-11-23 | Stop reason: HOSPADM

## 2024-11-21 RX ORDER — SODIUM CITRATE AND CITRIC ACID MONOHYDRATE 334; 500 MG/5ML; MG/5ML
30 SOLUTION ORAL ONCE
Status: DISCONTINUED | OUTPATIENT
Start: 2024-11-21 | End: 2024-11-23 | Stop reason: HOSPADM

## 2024-11-21 RX ORDER — METHYLERGONOVINE MALEATE 0.2 MG/ML
200 INJECTION INTRAVENOUS ONCE AS NEEDED
Status: DISCONTINUED | OUTPATIENT
Start: 2024-11-21 | End: 2024-11-23 | Stop reason: HOSPADM

## 2024-11-21 RX ORDER — OXYTOCIN/0.9 % SODIUM CHLORIDE 15/250 ML
95 PLASTIC BAG, INJECTION (ML) INTRAVENOUS CONTINUOUS PRN
Status: DISCONTINUED | OUTPATIENT
Start: 2024-11-21 | End: 2024-11-23 | Stop reason: HOSPADM

## 2024-11-21 RX ORDER — HYDROCODONE BITARTRATE AND ACETAMINOPHEN 5; 325 MG/1; MG/1
1 TABLET ORAL EVERY 4 HOURS PRN
Status: DISCONTINUED | OUTPATIENT
Start: 2024-11-21 | End: 2024-11-23 | Stop reason: HOSPADM

## 2024-11-21 RX ORDER — ROPIVACAINE HYDROCHLORIDE 2 MG/ML
INJECTION, SOLUTION EPIDURAL; INFILTRATION
Status: COMPLETED | OUTPATIENT
Start: 2024-11-21 | End: 2024-11-21

## 2024-11-21 RX ORDER — DOCUSATE SODIUM 100 MG/1
200 CAPSULE, LIQUID FILLED ORAL 2 TIMES DAILY PRN
Status: DISCONTINUED | OUTPATIENT
Start: 2024-11-21 | End: 2024-11-23 | Stop reason: HOSPADM

## 2024-11-21 RX ORDER — TRANEXAMIC ACID 10 MG/ML
1000 INJECTION, SOLUTION INTRAVENOUS EVERY 30 MIN PRN
Status: DISCONTINUED | OUTPATIENT
Start: 2024-11-21 | End: 2024-11-23 | Stop reason: HOSPADM

## 2024-11-21 RX ORDER — ACETAMINOPHEN 325 MG/1
650 TABLET ORAL EVERY 6 HOURS SCHEDULED
Status: DISCONTINUED | OUTPATIENT
Start: 2024-11-21 | End: 2024-11-23 | Stop reason: HOSPADM

## 2024-11-21 RX ORDER — HYDROCODONE BITARTRATE AND ACETAMINOPHEN 500; 5 MG/1; MG/1
TABLET ORAL
Status: DISCONTINUED | OUTPATIENT
Start: 2024-11-21 | End: 2024-11-23 | Stop reason: HOSPADM

## 2024-11-21 RX ORDER — HETASTARCH 6 G/100ML
500 INJECTION, SOLUTION INTRAVENOUS ONCE
Status: DISCONTINUED | OUTPATIENT
Start: 2024-11-21 | End: 2024-11-21

## 2024-11-21 RX ORDER — ONDANSETRON 8 MG/1
8 TABLET, ORALLY DISINTEGRATING ORAL EVERY 8 HOURS PRN
Status: DISCONTINUED | OUTPATIENT
Start: 2024-11-21 | End: 2024-11-23 | Stop reason: HOSPADM

## 2024-11-21 RX ORDER — CARBOPROST TROMETHAMINE 250 UG/ML
250 INJECTION, SOLUTION INTRAMUSCULAR
Status: DISCONTINUED | OUTPATIENT
Start: 2024-11-21 | End: 2024-11-23 | Stop reason: HOSPADM

## 2024-11-21 RX ORDER — OXYTOCIN 10 [USP'U]/ML
10 INJECTION, SOLUTION INTRAMUSCULAR; INTRAVENOUS ONCE AS NEEDED
Status: DISCONTINUED | OUTPATIENT
Start: 2024-11-21 | End: 2024-11-23 | Stop reason: HOSPADM

## 2024-11-21 RX ORDER — DIPHENHYDRAMINE HYDROCHLORIDE 50 MG/ML
25 INJECTION INTRAMUSCULAR; INTRAVENOUS EVERY 4 HOURS PRN
Status: DISCONTINUED | OUTPATIENT
Start: 2024-11-21 | End: 2024-11-23 | Stop reason: HOSPADM

## 2024-11-21 RX ORDER — OXYTOCIN/0.9 % SODIUM CHLORIDE 15/250 ML
0-32 PLASTIC BAG, INJECTION (ML) INTRAVENOUS CONTINUOUS
Status: DISCONTINUED | OUTPATIENT
Start: 2024-11-21 | End: 2024-11-21

## 2024-11-21 RX ORDER — FAMOTIDINE 10 MG/ML
20 INJECTION INTRAVENOUS ONCE
Status: DISCONTINUED | OUTPATIENT
Start: 2024-11-21 | End: 2024-11-23 | Stop reason: HOSPADM

## 2024-11-21 RX ORDER — SODIUM CHLORIDE 0.9 % (FLUSH) 0.9 %
10 SYRINGE (ML) INJECTION
Status: DISCONTINUED | OUTPATIENT
Start: 2024-11-21 | End: 2024-11-23 | Stop reason: HOSPADM

## 2024-11-21 RX ORDER — DIPHENOXYLATE HYDROCHLORIDE AND ATROPINE SULFATE 2.5; .025 MG/1; MG/1
2 TABLET ORAL EVERY 6 HOURS PRN
Status: DISCONTINUED | OUTPATIENT
Start: 2024-11-21 | End: 2024-11-23 | Stop reason: HOSPADM

## 2024-11-21 RX ADMIN — OXYTOCIN 95 MILLI-UNITS/MIN: 10 INJECTION, SOLUTION INTRAMUSCULAR; INTRAVENOUS at 08:11

## 2024-11-21 RX ADMIN — TRANEXAMIC ACID 1000 MG: 10 INJECTION, SOLUTION INTRAVENOUS at 07:11

## 2024-11-21 RX ADMIN — HYDROCODONE BITARTRATE AND ACETAMINOPHEN 1 TABLET: 5; 325 TABLET ORAL at 11:11

## 2024-11-21 RX ADMIN — ACETAMINOPHEN 650 MG: 325 TABLET ORAL at 06:11

## 2024-11-21 RX ADMIN — ROPIVACAINE HYDROCHLORIDE 8 ML: 2 INJECTION, SOLUTION EPIDURAL; INFILTRATION at 02:11

## 2024-11-21 RX ADMIN — IBUPROFEN 600 MG: 600 TABLET, FILM COATED ORAL at 11:11

## 2024-11-21 RX ADMIN — IBUPROFEN 600 MG: 600 TABLET, FILM COATED ORAL at 06:11

## 2024-11-21 RX ADMIN — OXYTOCIN 2 MILLI-UNITS/MIN: 10 INJECTION, SOLUTION INTRAMUSCULAR; INTRAVENOUS at 04:11

## 2024-11-21 RX ADMIN — SODIUM CHLORIDE, POTASSIUM CHLORIDE, SODIUM LACTATE AND CALCIUM CHLORIDE: 600; 310; 30; 20 INJECTION, SOLUTION INTRAVENOUS at 04:11

## 2024-11-21 RX ADMIN — SODIUM CHLORIDE, POTASSIUM CHLORIDE, SODIUM LACTATE AND CALCIUM CHLORIDE 500 ML: 600; 310; 30; 20 INJECTION, SOLUTION INTRAVENOUS at 07:11

## 2024-11-21 RX ADMIN — ROPIVACAINE HYDROCHLORIDE 14 ML/HR: 2 INJECTION, SOLUTION EPIDURAL; INFILTRATION at 02:11

## 2024-11-21 RX ADMIN — EPHEDRINE SULFATE 50 MG: 50 INJECTION INTRAVENOUS at 08:11

## 2024-11-21 RX ADMIN — SODIUM CHLORIDE, POTASSIUM CHLORIDE, SODIUM LACTATE AND CALCIUM CHLORIDE 1000 ML: 600; 310; 30; 20 INJECTION, SOLUTION INTRAVENOUS at 02:11

## 2024-11-21 NOTE — PROGRESS NOTES
S: in tub breathing through contractions  O:  VS reviewed, afebrile   Vitals:    11/20/24 1855   BP: 129/86   Pulse: 96   Resp: 18   Weight: 74.6 kg (164 lb 7.4 oz)       FHTs last spot check 145  UC refused  SVE declined     A: IUP @ 40w2d ;     Patient Active Problem List   Diagnosis    Supervision of normal first pregnancy, antepartum    Low-lying placenta    Normal labor       P:   Continue close monitoring of maternal/fetal status  Continue POC

## 2024-11-21 NOTE — PROGRESS NOTES
S: moaning through contractions  O:  VS reviewed, afebrile   Vitals:    24 1855   BP: 129/86   Pulse: 96   Resp: 18   Weight: 74.6 kg (164 lb 7.4 oz)       FHTs last spot check 140  UC refused monitoring  SVE /0, AROM clear    A: IUP @ 40w2d ;     Patient Active Problem List   Diagnosis    Supervision of normal first pregnancy, antepartum    Low-lying placenta    Normal labor       P:   Continue close monitoring of maternal/fetal status  Continue POC  CNM called to bedside. Patient requesting AROM  Anticipate

## 2024-11-21 NOTE — PROGRESS NOTES
S: sitting up in good spirits, reports feeling much better  O:  VS reviewed, afebrile   Vitals:    24 1121 24 1200 24 1300 24 1400   BP: 113/66 108/88     Pulse: 108 (!) 134 (!) 141 (!) 128   Resp:       Temp:       TempSrc:       SpO2: 100% 99% 99% 99%   Weight:           Fundus firm  Bleeding scant on pad, vaginal packing removed intact with small amount of blood noted  No active bleeding noted  No bruising or evidence of hematoma  Urine output appropriate    CBC at 1200- 8.7/25.5    A: IUP @ 40w3d ;     Patient Active Problem List   Diagnosis    Supervision of normal first pregnancy, antepartum    Low-lying placenta     (normal spontaneous vaginal delivery)    Other immediate postpartum hemorrhage    Laceration of vaginal wall or sulcus without perineal laceration during delivery    Single live birth       P:   Reviewed POC with Dr. Francis  Repeat CBC at 1600, consider blood transfusion if H&H decreases on next blood draw and/or if patient becomes symptomatic  Will monitor for 30 minutes and take out webb catheter/stop epidural if bleeding remains stable

## 2024-11-21 NOTE — ASSESSMENT & PLAN NOTE
VE 6-7/-1  Admit to LD for labor  Patient desires NCB/tub   present and has signed agreement  Patient refused EFM, IV, and labs.    Reviewed policies and recommendations regarding EFM, IV, and labs. After in depth conversation patient will consider NST and intermittent monitoring as well as saline lock.  Will discuss with  and  and notify with decision.   Anticipate

## 2024-11-21 NOTE — PROGRESS NOTES
S: called to room by RN due to patient passing out when getting up to BR (unconscious for ~20 seconds per RN). RN caught patient and pt did not fall. RN x4 at bedside when I entered the room patient was already back in the bed and awake    O:  VS reviewed, afebrile   Vitals:    24 1737 24 1740 24 1746 24 1751   BP: 115/70 129/73     Pulse: 94 96 107 101   Resp:       Temp:       TempSrc:       SpO2:   100% 100%   Weight:         Pallor noted  AAOx3  Labial edema noted, no evidence of hematoma  Dr. Francis at bedside and assessment performed, confirmed no hematoma.  VSS      A: IUP @ 40w3d ;     Patient Active Problem List   Diagnosis    Supervision of normal first pregnancy, antepartum    Low-lying placenta     (normal spontaneous vaginal delivery)    Other immediate postpartum hemorrhage    Laceration of vaginal wall or sulcus without perineal laceration during delivery    Single live birth       P:   Dr. Francis discussed recent CBC result (8.5.7) with patient passing out, recommend 1 unit PRBC, pt agreeable. Blood transfusion ordered. Consents signed.   Repeat CBC in am  Continue to monitor pt status closely

## 2024-11-21 NOTE — SIGNIFICANT EVENT
Was called to delivery by CNM following delivery of the infant and placenta to assess vaginal laceration.    Upon arrival, CNM had already repaired midline second degree laceration.  Pt had brisk bleeding from right vaginal sulcal laceration.  Retractor was placed and vagina packed with laparotomy sponge.  The apex of the right vaginal deep sulcal laceration was identified and tagged with 2-0 chromic suture.  Several deep 2-0 figure of 8 sutures were placed to close dead space along the length of the sulcal laceration.  Vaginal mucosa was then closed with 2-0 chromic running locking stitch working from the apex of the sulcal laceration to its distal point at the hymen.  Excellent hemostasis was then noted along this area.  Area of oozing at 6 o'clock at the hymen was controlled with 2-0 figure of 8 suture.  Laparotomy sponge was removed.  Full assessment of all lacerations revealed good hemostasis.  Oreilly catheter and vaginal packing placed.  Will check CBC.

## 2024-11-21 NOTE — LACTATION NOTE
LACTATION ROUNDS  SUBJECTIVE  Mother reports:  Feedings are excellent.  Confidence with breastfeeding denies pain associated with breastfeeding.  Infant is satisfied after breastfeeding, has appropriate swallows at the breast, and affirms signs of effective milk transfer.     OBJECTIVE    Infant's relevant history: deep suctioning   PLAN    Routine lactation care. Assessment and education ongoing. Individualized interventions to be implemented with any future changes in clinical presentation.     Encouraged mother to call for latch assessment     EDUCATION    Education provided:  -proper position basics  -signs of effective latch  -signs of ineffective latch  -signs of nutritive suck patterns  -signs of non nutritive suck patterns  -breast massage and compression and indication for use  -notify lactation if nipple pain begins  -mechanism of milk production and maintenance  -normalcy and importance of cluster feeding  -frequent feeds based on early cues, wake as needed, feed skin to skin and frequent skin to skin contact  -expected feeding and output patterns for days of life 1 & 2  -risks and implications of artifical nipples and non medically indicated formula supplementation     Mother has received a double electric Lansinoh breast pump for home use from her insurance company.    Mother denies any further lactation needs or concerns at this time. Encouraged mother to call for assistance when desired or when infant is showing signs of hunger. Mother verbalizes understanding of all education and counseling.

## 2024-11-21 NOTE — SUBJECTIVE & OBJECTIVE
Obstetric HPI:  Patient reports Frequency: Every 4-5 minutes contractions, active fetal movement, No vaginal bleeding , No loss of fluid     This pregnancy has been complicated by low-lying placenta (resolved)    OB History    Para Term  AB Living   1 0 0 0 0 0   SAB IAB Ectopic Multiple Live Births   0 0 0 0 0      # Outcome Date GA Lbr Maximus/2nd Weight Sex Type Anes PTL Lv   1 Current              Past Medical History:   Diagnosis Date    Dyspareunia 2022    [place] was very painful intially, its gotten a lot better over the past few months. Some postions are painful at times and some are better.     Past Surgical History:   Procedure Laterality Date    WISDOM TOOTH EXTRACTION  2024       No medications prior to admission.       Review of patient's allergies indicates:  No Known Allergies     Family History       Problem Relation (Age of Onset)    Diabetes Brother          Tobacco Use    Smoking status: Never    Smokeless tobacco: Never   Substance and Sexual Activity    Alcohol use: Not Currently     Alcohol/week: 1.0 standard drink of alcohol     Types: 1 Glasses of wine per week     Comment: Lately, I drink less than that.    Drug use: Never    Sexual activity: Yes     Partners: Male     Birth control/protection: Coitus interruptus, Rhythm     Review of Systems   Gastrointestinal:  Positive for abdominal pain.   All other systems reviewed and are negative.     Objective:     Vital Signs (Most Recent):    Vital Signs (24h Range):           There is no height or weight on file to calculate BMI.    FHT: refused  TOCO:  refused     Physical Exam:   Constitutional: She is oriented to person, place, and time. She appears well-developed and well-nourished.       Cardiovascular:  Normal rate.             Pulmonary/Chest: Effort normal. No respiratory distress.        Abdominal: Soft.     Genitourinary:    Vagina and uterus normal.             Musculoskeletal: Moves all extremeties.        Neurological: She is alert and oriented to person, place, and time.    Skin: Skin is warm and dry.    Psychiatric: She has a normal mood and affect.        Cervix: per RN  Dilation:  6-7  Effacement:  80  Station: -1  Presentation: Vertex     Significant Labs:  Lab Results   Component Value Date    HEPBSAG Non-reactive 05/02/2024       I have personallly reviewed all pertinent lab results from the last 24 hours.

## 2024-11-21 NOTE — LACTATION NOTE
This note was copied from a baby's chart.  Discussed practices that support optimal maternity care and  feeding such as immediate skin to skin, the magic first hour, the importance of the first feeding, and delaying routine procedures. Also discussed continued skin to skin contact, rooming-in, and feeding on cue. Discussed feeding choice with mother. Reviewed benefits of breastfeeding and risks of formula feeding. Mother states her intention is to breastfeed.    Discussed early feeding cues and encouraged mother to feed baby in response to those cues. Encouraged unrestricted feedings rather than timed/amount limits, procedural schedules, or visitation schedules. Reviewed normal feeding expectations of 8 or more feedings per 24 hour period, cues that babies use to signal hunger and satiety, and the importance of physical contact during feeding.

## 2024-11-21 NOTE — ANESTHESIA PROCEDURE NOTES
Epidural    Patient location during procedure: OB   Reason for block: primary anesthetic   Reason for block: labor analgesia requested by patient and obstetrician  Diagnosis: IUP, Labor Pain   Start time: 11/21/2024 2:41 AM  Timeout: 11/21/2024 2:41 AM  End time: 11/21/2024 2:52 AM    Staffing  Performing Provider: Oscar Reed CRNA  Authorizing Provider: Astrid Nye MD    Staffing  Performed by: Oscar Reed CRNA  Authorized by: Astrid Nye MD        Preanesthetic Checklist  Completed: patient identified, IV checked, site marked, risks and benefits discussed, monitors and equipment checked, pre-op evaluation, timeout performed, anesthesia consent given, hand hygiene performed and patient being monitored  Preparation  Patient position: sitting  Prep: ChloraPrep  Patient monitoring: Pulse Ox and Blood Pressure  Reason for block: primary anesthetic   Epidural  Skin Anesthetic: lidocaine 1%  Skin Wheal: 3 mL  Administration type: continuous  Approach: midline  Interspace: L3-4    Injection technique: RANCHO air  Needle and Epidural Catheter  Needle type: Tuohy   Needle gauge: 17  Needle length: 3.5 inches  Needle insertion depth: 4 cm  Catheter type: springwound and multi-orifice  Catheter size: 19 G  Catheter at skin depth: 10 cm  Insertion Attempts: 1  Test dose: 3 mL of lidocaine 1.5% with Epi 1-to-200,000  Additional Documentation: incremental injection, no paresthesia on injection, negative aspiration for heme and CSF, no significant pain on injection, no signs/symptoms of IV or SA injection and no significant complaints from patient  Needle localization: anatomical landmarks  Assessment  Upper dermatomal levels - Left: T10  Right: T10   Dermatomal levels determined by alcohol wipe  Ease of block: easy  Patient's tolerance of the procedure: comfortable throughout block and no complaints No inadvertent dural puncture with Tuohy.  Dural puncture not performed with spinal  needle    Medications:    Medications: ropivacaine (NAROPIN) solution 0.2% - Epidural   8 mL - 11/21/2024 2:47:00 AM

## 2024-11-21 NOTE — PLAN OF CARE
Patient stable. Fundus is firm, bleedings is appropriate post delivery, monitoring continuously d/t QBL 1290mL. Blood pressures are also stable. Ensuring mother is appropriately bonding with baby, responding to baby's cues.       Problem: Adult Inpatient Plan of Care  Goal: Plan of Care Review  Outcome: Progressing  Goal: Patient-Specific Goal (Individualized)  Outcome: Progressing  Goal: Absence of Hospital-Acquired Illness or Injury  Outcome: Progressing  Goal: Optimal Comfort and Wellbeing  Outcome: Progressing  Goal: Readiness for Transition of Care  Outcome: Progressing     Problem:  Fall Injury Risk  Goal: Absence of Fall, Infant Drop and Related Injury  Outcome: Progressing     Problem: Infection  Goal: Absence of Infection Signs and Symptoms  Outcome: Progressing     Problem: Labor  Goal: Hemostasis  Outcome: Progressing  Goal: Stable Fetal Wellbeing  Outcome: Progressing  Goal: Effective Progression to Delivery  Outcome: Progressing  Goal: Absence of Infection Signs and Symptoms  Outcome: Progressing  Goal: Acceptable Pain Control  Outcome: Progressing  Goal: Normal Uterine Contraction Pattern  Outcome: Progressing

## 2024-11-21 NOTE — HOSPITAL COURSE
VE 6-780/-1  Admit to LD for labor  Patient desires NCB/tub   present and has signed agreement  Patient refused EFM, IV, and labs.    Reviewed policies and recommendations regarding EFM, IV, and labs. After in depth conversation patient will consider NST and intermittent monitoring as well as saline lock.  Will discuss with  and  and notify with decision.   Anticipate      24     0715 hrs  of viable female infant, Dr Francis repaired R sulcus tear, vaginal pack and webb inserted    1419 hrs-vag pack removed per CNM, small amt bleeding noted, webb removed 30 minutes after vag pack    1751 hrs-CNM called to room for syncopal episode, pt did not fall, nurse at bedside, lasted ~ 20 seconds, reviewed case w/ MD and rec 1 unit of PRBC's, pt agreed    24     PPD #1 S/P receiving 1 unit PRBC's, H&H this am 30.1 / 10.1.  Patient remains significantly symptomatic with + orthostatic VS.  Patient agrees to receive additional unit of PRBC's.  Orders placed. Orthostatic precautions and continue with routine PP care    PPD #2, pt is doing ok today, baby is in NICU d/t baby not voiding in 35 hrs per MB nurse, but baby has since voided and will be sent back to mom and dad then discharged home, pt states she feel like she has to have a BM but is afraid to due to laceration repairs, reassured pt, recommend stool softener bid, adequate fluid intake, will send motrin and [place] to pharmacy, pt is also s/p 2 units of PRBC's, post transfusion CBC stable at 32.1/11, d/c home today w/ baby

## 2024-11-21 NOTE — LACTATION NOTE
Lactation called to room. Feeding completed, infant skin to skin in quiet alert state. Mother states infant was clicking through entire feeding and she had mild nipple pain during the feeding. Discussed nipple pain and clicking are both not expected or normal and that further assessment is needed if with persist with any subsequent feeding. Instructed mother to call for assessment with next feeding. Mother verbalizes understanding.

## 2024-11-21 NOTE — L&D DELIVERY NOTE
[facility] - Labor & Delivery  Vaginal Delivery   Operative Note    SUMMARY     Normal spontaneous vaginal delivery of live infant, was placed on mothers abdomen for skin to skin and bulb suctioning performed.  Infant delivered position HEIDI over intact perineum.  Nuchal cord: No.    Spontaneous delivery of placenta and IV pitocin given noting good uterine tone but moderate amount of bleeding noted. Confirmed fundus firm. TXA given. Bleeding noted to be from deep sulcal laceration.  Difficulty visualizing lacerations due to continued bleeding. Called Dr. Francis for assistance.   R lateral sulcal laceration repaired using 2-0 chromic, vaginal floor laceration repaired using 2-0 chromic repaired by Dr. Francis . Hemostasis achieved and bleeding stable.  Patient tolerated delivery well. Sponge needle and lap counted correctly x2.  EBL 1290  Stat CBC obtained    Per Dr. Francis--vaginal packing wetted with sterile NS (curlex) placed in vagina with plan to keep in place for 6 hours and revaluate. Will keep epidural infusion on until vaginal packing removed.    Stat H&H 11.5/33.0 (starting 12.4/40.4)  Will repeat CBC in 4 hours (noon)    Called back to room at 0814-Maternal hypotension noted with mild tachycardia, ephedrine given and Hespan started per CRNA. BP normalizes. Fundus remains firm and bleeding remains stable. Patient feeling better after BP normalized. Will continue to monitor closely.     Indications:  (normal spontaneous vaginal delivery)  Pregnancy complicated by:   Patient Active Problem List   Diagnosis    Supervision of normal first pregnancy, antepartum    Low-lying placenta     (normal spontaneous vaginal delivery)    Other immediate postpartum hemorrhage    Laceration of vaginal wall or sulcus without perineal laceration during delivery    Single live birth     Admitting GA: 40w3d    Delivery Information for Angela Pritchard    Birth information:  YOB: 2025   Time of birth:  "7:15 AM   Sex: female   Head Delivery Date/Time: 2024  7:15 AM   Delivery type: Vaginal, Spontaneous   Gestational Age: 40w3d       Delivery Providers    Delivering clinician: Yue Ritchie CNM           Measurements    Weight: 3510 g  Weight (lbs): 7 lb 11.8 oz  Length: 53 cm  Length (in): 20.87"  Head circumference: 36 cm  Chest circumference: 33.5 cm  Abdominal girth: 31 cm         Apgars    Living status: Living  Apgar Component Scores:  1 min.:  5 min.:  10 min.:  15 min.:  20 min.:    Skin color:  0  1  1      Heart rate:  2  2  2      Reflex irritability:  2  2  2      Muscle tone:  0  1  2      Respiratory effort:  1  2  2      Total:  5  8  9      Apgars assigned by: VIRGINIA VELEZ/KATINA COLEMAN         Operative Delivery    Forceps attempted?: No  Vacuum extractor attempted?: No         Shoulder Dystocia    Shoulder dystocia present?: No           Presentation    Presentation: Vertex  Position: Left Occiput Anterior           Interventions/Resuscitation    Method: NICU Attended, Bulb Suctioning, Tactile Stimulation, Deep Suctioning       Cord    Vessels: 3 vessels  Complications: None  Delayed Cord Clamping?: Yes  Cord Clamped Date/Time: 2024  7:16 AM  Cord Blood Disposition: Discarded  Gases Sent?: No  Stem Cell Collection (by MD): No       Placenta    Placenta delivery date/time: 2024 0720  Placenta removal: Spontaneous  Placenta appearance: Intact  Placenta disposition: Discarded           Labor Events:       labor: No     Labor Onset Date/Time: 2024       Dilation Complete Date/Time: 2024 04:41     Start Pushing Date/Time: 2024 05:35       Start Pushing Date/Time: 2024 05:35     Rupture Date/Time: 24 3350        Rupture type: ARM (Artificial Rupture)        Fluid Amount:       Fluid Color: Clear              steroids: None     Antibiotics given for GBS: No     Induction: none     Indications for induction:        Augmentation: " oxytocin     Indications for augmentation: Ineffective Contraction Pattern     Labor complications: Other Excessive Bleeding     Additional complications:          Cervical ripening:                     Delivery:      Episiotomy: None     Indication for Episiotomy:       Perineal Lacerations: None Repaired:      Periurethral Laceration:   Repaired:     Labial Laceration:   Repaired:     Sulcus Laceration: right Repaired: Yes   Vaginal Laceration: Yes Repaired: Yes   Cervical Laceration:   Repaired:     Repair suture:       Repair # of packets: 5     Last Value - EBL - Nursing (mL):       Sum - EBL - Nursing (mL): 0     Last Value - EBL - Anesthesia (mL):      Calculated QBL (mL): 1290     Running total QBL (mL): 1290     Vaginal Sweep Performed: Yes     Surgicount Correct: Yes     Vaginal Packing: Yes Quantity: 1     Other providers:       Anesthesia    Method: Epidural          Details (if applicable):  Trial of Labor      Categorization:      Priority:     Indications for :     Incision Type:       Additional  information:  Forceps:    Vacuum:    Breech:    Observed anomalies    Other (Comments):

## 2024-11-21 NOTE — ANESTHESIA POSTPROCEDURE EVALUATION
Anesthesia Post Evaluation    Patient: Katelyn Pritchard    Procedure(s) Performed: * No procedures listed *    Final Anesthesia Type: epidural      Patient location during evaluation: labor & delivery  Patient participation: Yes- Able to Participate  Level of consciousness: awake and alert and oriented  Post-procedure vital signs: reviewed and stable  Pain management: adequate  Airway patency: patent    PONV status at discharge: No PONV  Anesthetic complications: no      Cardiovascular status: blood pressure returned to baseline, stable and hemodynamically stable  Respiratory status: unassisted, room air and spontaneous ventilation  Hydration status: euvolemic  Follow-up not needed.              Vitals Value Taken Time   /66 11/21/24 1308   Temp 36.5 °C (97.7 °F) 11/21/24 1115   Pulse 113 11/21/24 1501   Resp 16 11/21/24 1115   SpO2 100 % 11/21/24 1501   Vitals shown include unfiled device data.      No case tracking events are documented in the log.      Pain/Kel Score: No data recorded

## 2024-11-21 NOTE — H&P
[facility] - Labor & Delivery  Obstetrics  History & Physical    Patient Name: Katelyn Pritchard  MRN: [id number]  Admission Date: 2024  Primary Care Provider: Jason Mark MD    Subjective:     Principal Problem:Normal labor    History of Present Illness:  29 y.o.  40w2d with c/o contractions since 0300 yesterday morning that have gotten closer together this afternoon       Obstetric HPI:  Patient reports Frequency: Every 4-5 minutes contractions, active fetal movement, No vaginal bleeding , No loss of fluid     This pregnancy has been complicated by low-lying placenta (resolved)    OB History    Para Term  AB Living   1 0 0 0 0 0   SAB IAB Ectopic Multiple Live Births   0 0 0 0 0      # Outcome Date GA Lbr Maximus/2nd Weight Sex Type Anes PTL Lv   1 Current              Past Medical History:   Diagnosis Date    Dyspareunia 2022    [place] was very painful intially, its gotten a lot better over the past few months. Some postions are painful at times and some are better.     Past Surgical History:   Procedure Laterality Date    WISDOM TOOTH EXTRACTION  2024       No medications prior to admission.       Review of patient's allergies indicates:  No Known Allergies     Family History       Problem Relation (Age of Onset)    Diabetes Brother          Tobacco Use    Smoking status: Never    Smokeless tobacco: Never   Substance and Sexual Activity    Alcohol use: Not Currently     Alcohol/week: 1.0 standard drink of alcohol     Types: 1 Glasses of wine per week     Comment: Lately, I drink less than that.    Drug use: Never    Sexual activity: Yes     Partners: Male     Birth control/protection: Coitus interruptus, Rhythm     Review of Systems   Gastrointestinal:  Positive for abdominal pain.   All other systems reviewed and are negative.     Objective:     Vital Signs (Most Recent):    Vital Signs (24h Range):           There is no height or weight on file to calculate BMI.    FHT:  refused  TOCO:  refused     Physical Exam:   Constitutional: She is oriented to person, place, and time. She appears well-developed and well-nourished.       Cardiovascular:  Normal rate.             Pulmonary/Chest: Effort normal. No respiratory distress.        Abdominal: Soft.     Genitourinary:    Vagina and uterus normal.             Musculoskeletal: Moves all extremeties.       Neurological: She is alert and oriented to person, place, and time.    Skin: Skin is warm and dry.    Psychiatric: She has a normal mood and affect.        Cervix: per RN  Dilation:  6-7  Effacement:  80  Station: -1  Presentation: Vertex     Significant Labs:  Lab Results   Component Value Date    HEPBSAG Non-reactive 2024       I have personallly reviewed all pertinent lab results from the last 24 hours.  Assessment/Plan:     29 y.o. female  at 40w2d for:    * Normal labor  VE 6-7/80/-1  Admit to LD for labor  Patient desires NCB/tub   present and has signed agreement  Patient refused EFM, IV, and labs.    Reviewed policies and recommendations regarding EFM, IV, and labs. After in depth conversation patient will consider NST and intermittent monitoring as well as saline lock.  Will discuss with  and  and notify with decision.   Anticipate NATHANAEL Peter CNM  Obstetrics  [facility] - Labor & Delivery

## 2024-11-21 NOTE — PROGRESS NOTES
11/21/24 0707   [facility] Note - Communication   [facility] Nurse Communicated with Bedside Nurse Regarding: Fetal Status   [facility] Note - Notification   Nurse Notified? Yes  (Xiomy will call MD to review strip)   Name of Nurse Xiomy

## 2024-11-21 NOTE — LACTATION NOTE
LACTATION CALLED BACK TO ROOM    Infant skin to skin at mother's left breast, intermittent sleeping and crying, no cues present.     SUBJECTIVE  Mother reports:  Clicking with each suck, pain 5/10     OBJECTIVE    When crying, infant noted to have flat tongue surface that does not elevate much higher than gumline. Suck assessment differed at this time due to less than 24 hours old.    BREASTFEEDING  Breastfeeding    [] Right breast   [x] Left breast                Position [x] cross cradle [] cradle [] football [] laid-back   Gape [x] adequate [] narrow []  []    Latch [x] successful [] unsuccessful [x] required intervention, minimal assist, mother able to return demonstrate  []   Lip flange [x] top neutral [x] bottom manually flanged [] top tucked [] bottom tucked   Oral seal [x] adequate [] poor []    Cheeks [x] round [] dimpled [] broken cheek line [] flat   Jaw [x] rocker [] piston [] chomping [] fasciculations   Maternal pain [] none [x] Mild 3/10 [] moderate [] severe   Swallow [x] observed [] not observed []  []    Swallow rate [x] appropriate [] minimal [] none []    Difficulties [] none [] coughing [] choking [] gagging    [x] clicking [] wet/hoarse/breathy vocal quality [] breathing difficulty or tachypnea [] fatigue    [] smacking [] weak/disorganized suck [] infant inactive [] loss of milk    [] pop-offs [] arching []  []    After feeding:     Maternal nipple  [] WDL [x] slightly compressed [] compressed  [] blanched   Baby after feeding [x] content [] feeding cues  [] fussy [] fatigued    [x] Left skin to skin in mothers chest []    Notes:  Infant fed for approximately 3 minutes and disengages with feeding. Further breastfeeding assessment needed.          ASSESSMENT FINDINGS    Maternal comfort impaired due to painful latching       PLAN    Further assessment needed. Until then, routine lactation care. Assessment and education ongoing. Individualized interventions to be implemented with any future  changes in clinical presentation.      EDUCATION    Education provided:  -proper position basics  -asymmetrical latch techniques  -signs of effective latch  -signs of nutritive suck patterns  -nipple pain and clicking abnormal  -further assessment is needed, as this was a short encounter at the breast  -call for assessment when cues resume  -frequent feeds based on early cues, wake as needed, feed skin to skin and frequent skin to skin contact    Mother verbalizes understanding of all education and counseling. Mother denies any further lactation needs or concerns at this time. Discussed lactation availability. Encouraged mother to call for assistance when needs arise.

## 2024-11-21 NOTE — ANESTHESIA PREPROCEDURE EVALUATION
11/21/2024  Katelyn Pritchard is a 29 y.o., female.      Pre-op Assessment    I have reviewed the Patient Summary Reports.     I have reviewed the Nursing Notes.    I have reviewed the Medications.     Review of Systems  Anesthesia Hx:  No problems with previous Anesthesia   History of prior surgery of interest to airway management or planning:  Previous anesthesia: Epidural        Denies Family Hx of Anesthesia complications.    Denies Personal Hx of Anesthesia complications.                    Social:  Non-Smoker, No Alcohol Use       Hematology/Oncology:  Hematology Normal   Oncology Normal                                   EENT/Dental:  EENT/Dental Normal           Cardiovascular:  Cardiovascular Normal Exercise tolerance: good   Denies Pacemaker.  Denies Hypertension.             NYHA Classification I ECG has been reviewed.                            Pulmonary:  Pulmonary Normal                       Renal/:  Renal/ Normal                 Hepatic/GI:  Hepatic/GI Normal                    Musculoskeletal:  Musculoskeletal Normal                Neurological:  Neurology Normal                                      Endocrine:  Endocrine Normal            Psych:  Psychiatric Normal                    Physical Exam  General: Well nourished, Cooperative, Alert and Oriented    Airway:  Mallampati: II   Mouth Opening: Normal  TM Distance: Normal  Tongue: Normal  Neck ROM: Normal ROM    Dental:  Intact        Anesthesia Plan  Type of Anesthesia, risks & benefits discussed:    Anesthesia Type: Epidural  Intra-op Monitoring Plan: Standard ASA Monitors  Post Op Pain Control Plan: epidural analgesia  Informed Consent: Informed consent signed with the Patient and all parties understand the risks and agree with anesthesia plan.  All questions answered.   ASA Score: 2  Day of Surgery Review of History & Physical: I have  interviewed and examined the patient. I have reviewed the patient's H&P dated:     Ready For Surgery From Anesthesia Perspective.     .

## 2024-11-21 NOTE — PROGRESS NOTES
S: breathing through contractions  O:  VS reviewed, afebrile   Vitals:    11/20/24 1855   BP: 129/86   Pulse: 96   Resp: 18   Weight: 74.6 kg (164 lb 7.4 oz)       FHTs last spot check at 0130:  130  UC refused  SVE no change    A: IUP @ 40w3d ;     Patient Active Problem List   Diagnosis    Supervision of normal first pregnancy, antepartum    Low-lying placenta    Normal labor       P:   Continue close monitoring of maternal/fetal status  Patient now requesting epidural.   Discussed policy on continuous fetal monitoring with epidural in place.   IV needed for epidural placement. Patient in agreement   IV started and labs sent.   CRNA called  Continue POC

## 2024-11-21 NOTE — PROGRESS NOTES
S: comfortable with epidural   O:  VS reviewed, afebrile   Vitals:    11/21/24 0435 11/21/24 0450 11/21/24 0503 11/21/24 0524   BP: 114/69 121/77 124/77 125/78   Pulse: 72  97    Resp: 18      SpO2: 97% 100% 100%    Weight:           FHTs 120 moderate variability, early decels noted   UC 2-7 min  SVE 8/90/-0    A: IUP @ 40w3d ;     Patient Active Problem List   Diagnosis    Supervision of normal first pregnancy, antepartum    Low-lying placenta    Normal labor       P:   Continue close monitoring of maternal/fetal status  Start pitocin 2x2  Continue POC

## 2024-11-21 NOTE — HPI
29 y.o.  40w2d with c/o contractions since 0300 yesterday morning that have gotten closer together this afternoon

## 2024-11-22 PROBLEM — Z34.00 SUPERVISION OF NORMAL FIRST PREGNANCY, ANTEPARTUM: Status: RESOLVED | Noted: 2024-07-24 | Resolved: 2024-11-22

## 2024-11-22 PROBLEM — O44.40 LOW-LYING PLACENTA: Status: RESOLVED | Noted: 2024-08-22 | Resolved: 2024-11-22

## 2024-11-22 LAB
BASOPHILS # BLD AUTO: 0.06 K/UL (ref 0–0.2)
BASOPHILS NFR BLD: 0.5 % (ref 0–1.9)
BLD PROD TYP BPU: NORMAL
BLOOD UNIT EXPIRATION DATE: NORMAL
BLOOD UNIT TYPE CODE: 6200
BLOOD UNIT TYPE: NORMAL
CODING SYSTEM: NORMAL
CROSSMATCH INTERPRETATION: NORMAL
DIFFERENTIAL METHOD BLD: ABNORMAL
DISPENSE STATUS: NORMAL
EOSINOPHIL # BLD AUTO: 0.2 K/UL (ref 0–0.5)
EOSINOPHIL NFR BLD: 1.4 % (ref 0–8)
ERYTHROCYTE [DISTWIDTH] IN BLOOD BY AUTOMATED COUNT: 14.3 % (ref 11.5–14.5)
HCT VFR BLD AUTO: 30.1 % (ref 37–48.5)
HGB BLD-MCNC: 10.1 G/DL (ref 12–16)
IMM GRANULOCYTES # BLD AUTO: 0.07 K/UL (ref 0–0.04)
IMM GRANULOCYTES NFR BLD AUTO: 0.6 % (ref 0–0.5)
LYMPHOCYTES # BLD AUTO: 2 K/UL (ref 1–4.8)
LYMPHOCYTES NFR BLD: 16.2 % (ref 18–48)
MCH RBC QN AUTO: 32.5 PG (ref 27–31)
MCHC RBC AUTO-ENTMCNC: 33.6 G/DL (ref 32–36)
MCV RBC AUTO: 97 FL (ref 82–98)
MONOCYTES # BLD AUTO: 0.7 K/UL (ref 0.3–1)
MONOCYTES NFR BLD: 5.8 % (ref 4–15)
NEUTROPHILS # BLD AUTO: 9.3 K/UL (ref 1.8–7.7)
NEUTROPHILS NFR BLD: 75.5 % (ref 38–73)
NRBC BLD-RTO: 0 /100 WBC
NUM UNITS TRANS PACKED RBC: NORMAL
PLATELET # BLD AUTO: 140 K/UL (ref 150–450)
PMV BLD AUTO: 12 FL (ref 9.2–12.9)
RBC # BLD AUTO: 3.11 M/UL (ref 4–5.4)
WBC # BLD AUTO: 12.34 K/UL (ref 3.9–12.7)

## 2024-11-22 PROCEDURE — 30233N1 TRANSFUSION OF NONAUTOLOGOUS RED BLOOD CELLS INTO PERIPHERAL VEIN, PERCUTANEOUS APPROACH: ICD-10-PCS | Performed by: ADVANCED PRACTICE MIDWIFE

## 2024-11-22 PROCEDURE — 86920 COMPATIBILITY TEST SPIN: CPT | Performed by: ADVANCED PRACTICE MIDWIFE

## 2024-11-22 PROCEDURE — 36415 COLL VENOUS BLD VENIPUNCTURE: CPT | Performed by: ADVANCED PRACTICE MIDWIFE

## 2024-11-22 PROCEDURE — 25000003 PHARM REV CODE 250: Performed by: ADVANCED PRACTICE MIDWIFE

## 2024-11-22 PROCEDURE — 85025 COMPLETE CBC W/AUTO DIFF WBC: CPT | Performed by: ADVANCED PRACTICE MIDWIFE

## 2024-11-22 PROCEDURE — 11000001 HC ACUTE MED/SURG PRIVATE ROOM

## 2024-11-22 PROCEDURE — P9016 RBC LEUKOCYTES REDUCED: HCPCS | Performed by: ADVANCED PRACTICE MIDWIFE

## 2024-11-22 RX ORDER — HYDROCODONE BITARTRATE AND ACETAMINOPHEN 500; 5 MG/1; MG/1
TABLET ORAL
Status: DISCONTINUED | OUTPATIENT
Start: 2024-11-22 | End: 2024-11-23 | Stop reason: HOSPADM

## 2024-11-22 RX ADMIN — ACETAMINOPHEN 650 MG: 325 TABLET ORAL at 05:11

## 2024-11-22 RX ADMIN — DOCUSATE SODIUM 200 MG: 100 CAPSULE, LIQUID FILLED ORAL at 05:11

## 2024-11-22 RX ADMIN — PRENATAL VITAMINS-IRON FUMARATE 27 MG IRON-FOLIC ACID 0.8 MG TABLET 1 TABLET: at 08:11

## 2024-11-22 RX ADMIN — ACETAMINOPHEN 650 MG: 325 TABLET ORAL at 11:11

## 2024-11-22 RX ADMIN — IBUPROFEN 600 MG: 600 TABLET, FILM COATED ORAL at 05:11

## 2024-11-22 NOTE — PLAN OF CARE
Patient afebrile and had no falls this shift. Fundus firm without massage and below umbilicus. Bleeding light, no clots passed this shift. Voids spontaneously. Ambulates independently. Pain well controlled with oral pain medication. Vital signs stable at this time. Bonding well with infant; responds to infant cues and participates in infant care.      Problem: Adult Inpatient Plan of Care  Goal: Plan of Care Review  Outcome: Progressing  Goal: Patient-Specific Goal (Individualized)  Outcome: Progressing  Goal: Absence of Hospital-Acquired Illness or Injury  Outcome: Progressing  Goal: Optimal Comfort and Wellbeing  Outcome: Progressing  Goal: Readiness for Transition of Care  Outcome: Progressing     Problem:  Fall Injury Risk  Goal: Absence of Fall, Infant Drop and Related Injury  Outcome: Progressing     Problem: Infection  Goal: Absence of Infection Signs and Symptoms  Outcome: Progressing     Problem: Skin Injury Risk Increased  Goal: Skin Health and Integrity  Outcome: Progressing     Problem: Postpartum (Vaginal Delivery)  Goal: Successful Parent Role Transition  Outcome: Progressing  Goal: Hemostasis  Outcome: Progressing  Goal: Absence of Infection Signs and Symptoms  Outcome: Progressing  Goal: Anesthesia/Sedation Recovery  Outcome: Progressing  Goal: Optimal Pain Control and Function  Outcome: Progressing  Goal: Effective Urinary Elimination  Outcome: Progressing

## 2024-11-22 NOTE — SUBJECTIVE & OBJECTIVE
Interval History:     She is not doing well this morning. She is tolerating a regular diet without nausea or vomiting. She is voiding spontaneously. She is ambulating. She has passed flatus, and has not a BM. Vaginal bleeding is mild. She denies fever or chills. Abdominal pain is mild and controlled with oral medications. She Is breastfeeding. She desires circumcision for her male baby: not applicable.    Objective:     Vital Signs (Most Recent):  Temp: 98.3 °F (36.8 °C) (11/22/24 0825)  Pulse: 106 (11/22/24 1200)  Resp: 16 (11/22/24 1155)  BP: 125/60 (11/22/24 1200)  SpO2: 100 % (11/22/24 1158) Vital Signs (24h Range):  Temp:  [97 °F (36.1 °C)-98.3 °F (36.8 °C)] 98.3 °F (36.8 °C)  Pulse:  [] 106  Resp:  [16-18] 16  SpO2:  [99 %-100 %] 100 %  BP: (111-131)/(59-80) 125/60     Weight: 74.6 kg (164 lb 7.4 oz)  Body mass index is 25.01 kg/m².      Intake/Output Summary (Last 24 hours) at 11/22/2024 1254  Last data filed at 11/22/2024 0649  Gross per 24 hour   Intake --   Output 1600 ml   Net -1600 ml         Significant Labs:  Lab Results   Component Value Date    GROUPTRH A POS 11/21/2024    HEPBSAG Non-reactive 05/02/2024     Recent Labs   Lab 11/22/24  0657   HGB 10.1*   HCT 30.1*       I have personallly reviewed all pertinent lab results from the last 24 hours.    Physical Exam:   Constitutional: She is oriented to person, place, and time.       Cardiovascular:             + ortho VS  standing     Pulmonary/Chest: Effort normal.          Genitourinary:    Uterus normal.             Musculoskeletal: Normal range of motion and moves all extremeties.       Neurological: She is alert and oriented to person, place, and time.    Skin: Skin is warm and dry.    Psychiatric: She has a normal mood and affect. Her behavior is normal. Judgment and thought content normal.       Review of Systems   Constitutional:  Positive for fatigue.   Neurological:         Dizziness feeling near syncope    All other systems  reviewed and are negative.

## 2024-11-22 NOTE — PLAN OF CARE
[facility] - [facility] (Hospital)  Discharge Assessment    Primary Care Provider: Jason Mark MD     OB Screen (most recent)       OB Screen - 24 0814          OB SCREEN    Assessment Type Discharge Planning Assessment     Source of Information patient;health record     Received Prenatal Care Yes     Any indications/suspicions for None     Is this a teen pregnancy No     Is the baby in NICU No     Indication for adoption/[facility] No     Indication for DME/post-acute needs No     HIV (+) No     Any congenital  disorders No     Fetal demise/ death No

## 2024-11-22 NOTE — PLAN OF CARE
CP cont/ongoing. VSS. Pain managed with current med regimen. Pt voiding freely. Pt breastfeeding. Mother bonding well with infant.

## 2024-11-22 NOTE — PROGRESS NOTES
[facility] ([facility])  Obstetrics  Postpartum Progress Note    Patient Name: Katelyn Pritchard  MRN: [id number]  Admission Date: 2024  Hospital Length of Stay: 2 days  Attending Physician: Floyd Edge MD  Primary Care Provider: Jason Mark MD    Subjective:     Principal Problem: (normal spontaneous vaginal delivery)    Hospital Course:  VE 6-780/-1  Admit to LD for labor  Patient desires NCB/tub   present and has signed agreement  Patient refused EFM, IV, and labs.    Reviewed policies and recommendations regarding EFM, IV, and labs. After in depth conversation patient will consider NST and intermittent monitoring as well as saline lock.  Will discuss with  and  and notify with decision.   Anticipate    24 PPD #1 S/P receiving 1 unit PRBC's, H&H this am 30.1 / 10.1.  Patient remains significantly symptomatic with + orthostatic VS.  Patient agrees to receive additional unit of PRBC's.  Orders placed. Orthostatic precautions and continue with routine PP care    Interval History:     She is not doing well this morning. She is tolerating a regular diet without nausea or vomiting. She is voiding spontaneously. She is ambulating. She has passed flatus, and has not a BM. Vaginal bleeding is mild. She denies fever or chills. Abdominal pain is mild and controlled with oral medications. She Is breastfeeding. She desires circumcision for her male baby: not applicable.    Objective:     Vital Signs (Most Recent):  Temp: 98.3 °F (36.8 °C) (24 0825)  Pulse: 106 (24 1200)  Resp: 16 (24 1155)  BP: 125/60 (24 1200)  SpO2: 100 % (24 1158) Vital Signs (24h Range):  Temp:  [97 °F (36.1 °C)-98.3 °F (36.8 °C)] 98.3 °F (36.8 °C)  Pulse:  [] 106  Resp:  [16-18] 16  SpO2:  [99 %-100 %] 100 %  BP: (111-131)/(59-80) 125/60     Weight: 74.6 kg (164 lb 7.4 oz)  Body mass index is 25.01 kg/m².      Intake/Output Summary (Last 24 hours) at 2024 1254  Last  data filed at 2024 0649  Gross per 24 hour   Intake --   Output 1600 ml   Net -1600 ml         Significant Labs:  Lab Results   Component Value Date    GROUPTRH A POS 2024    HEPBSAG Non-reactive 2024     Recent Labs   Lab 24  0657   HGB 10.1*   HCT 30.1*       I have personallly reviewed all pertinent lab results from the last 24 hours.    Physical Exam:   Constitutional: She is oriented to person, place, and time.       Cardiovascular:             + ortho VS  standing     Pulmonary/Chest: Effort normal.          Genitourinary:    Uterus normal.             Musculoskeletal: Normal range of motion and moves all extremeties.       Neurological: She is alert and oriented to person, place, and time.    Skin: Skin is warm and dry.    Psychiatric: She has a normal mood and affect. Her behavior is normal. Judgment and thought content normal.       Review of Systems   Constitutional:  Positive for fatigue.   Neurological:         Dizziness feeling near syncope    All other systems reviewed and are negative.    Assessment/Plan:     29 y.o. female  for:    *  (normal spontaneous vaginal delivery)  VE 6-7/80/-1  Admit to LD for labor  Patient desires NCB/tub   present and has signed agreement  Patient refused EFM, IV, and labs.    Reviewed policies and recommendations regarding EFM, IV, and labs. After in depth conversation patient will consider NST and intermittent monitoring as well as saline lock.  Will discuss with  and  and notify with decision.   Anticipate      Other immediate postpartum hemorrhage  24 Transfuse additional unit of PRBC's        Disposition: As patient meets milestones, will plan to discharge as meets milestones.    Ros Smith CNM  Obstetrics  [facility] ([facility])

## 2024-11-22 NOTE — PLAN OF CARE
Problem: Adult Inpatient Plan of Care  Goal: Plan of Care Review  Outcome: Progressing  Goal: Patient-Specific Goal (Individualized)  Outcome: Progressing  Goal: Absence of Hospital-Acquired Illness or Injury  Outcome: Progressing  Goal: Optimal Comfort and Wellbeing  Outcome: Progressing  Goal: Readiness for Transition of Care  Outcome: Progressing     Problem:  Fall Injury Risk  Goal: Absence of Fall, Infant Drop and Related Injury  Outcome: Progressing     Problem: Infection  Goal: Absence of Infection Signs and Symptoms  Outcome: Progressing     Problem: Skin Injury Risk Increased  Goal: Skin Health and Integrity  Outcome: Progressing     Problem: Postpartum (Vaginal Delivery)  Goal: Successful Parent Role Transition  Outcome: Progressing  Goal: Hemostasis  Outcome: Progressing  Goal: Absence of Infection Signs and Symptoms  Outcome: Progressing  Goal: Anesthesia/Sedation Recovery  Outcome: Progressing  Goal: Optimal Pain Control and Function  Outcome: Progressing  Goal: Effective Urinary Elimination  Outcome: Progressing

## 2024-11-23 VITALS
HEART RATE: 102 BPM | RESPIRATION RATE: 18 BRPM | WEIGHT: 164.44 LBS | SYSTOLIC BLOOD PRESSURE: 123 MMHG | DIASTOLIC BLOOD PRESSURE: 69 MMHG | TEMPERATURE: 99 F | OXYGEN SATURATION: 100 % | BODY MASS INDEX: 25.01 KG/M2

## 2024-11-23 LAB
BASOPHILS # BLD AUTO: 0.08 K/UL (ref 0–0.2)
BASOPHILS NFR BLD: 0.6 % (ref 0–1.9)
DIFFERENTIAL METHOD BLD: ABNORMAL
EOSINOPHIL # BLD AUTO: 0.2 K/UL (ref 0–0.5)
EOSINOPHIL NFR BLD: 1.8 % (ref 0–8)
ERYTHROCYTE [DISTWIDTH] IN BLOOD BY AUTOMATED COUNT: 15.1 % (ref 11.5–14.5)
HCT VFR BLD AUTO: 32.1 % (ref 37–48.5)
HGB BLD-MCNC: 11 G/DL (ref 12–16)
IMM GRANULOCYTES # BLD AUTO: 0.06 K/UL (ref 0–0.04)
IMM GRANULOCYTES NFR BLD AUTO: 0.4 % (ref 0–0.5)
LYMPHOCYTES # BLD AUTO: 1.7 K/UL (ref 1–4.8)
LYMPHOCYTES NFR BLD: 12.9 % (ref 18–48)
MCH RBC QN AUTO: 32 PG (ref 27–31)
MCHC RBC AUTO-ENTMCNC: 34.3 G/DL (ref 32–36)
MCV RBC AUTO: 93 FL (ref 82–98)
MONOCYTES # BLD AUTO: 0.7 K/UL (ref 0.3–1)
MONOCYTES NFR BLD: 5.3 % (ref 4–15)
NEUTROPHILS # BLD AUTO: 10.7 K/UL (ref 1.8–7.7)
NEUTROPHILS NFR BLD: 79 % (ref 38–73)
NRBC BLD-RTO: 0 /100 WBC
PLATELET # BLD AUTO: 177 K/UL (ref 150–450)
PMV BLD AUTO: 10.9 FL (ref 9.2–12.9)
RBC # BLD AUTO: 3.44 M/UL (ref 4–5.4)
WBC # BLD AUTO: 13.49 K/UL (ref 3.9–12.7)

## 2024-11-23 PROCEDURE — 25000003 PHARM REV CODE 250: Performed by: ADVANCED PRACTICE MIDWIFE

## 2024-11-23 PROCEDURE — 36415 COLL VENOUS BLD VENIPUNCTURE: CPT | Performed by: OBSTETRICS & GYNECOLOGY

## 2024-11-23 PROCEDURE — 85025 COMPLETE CBC W/AUTO DIFF WBC: CPT | Performed by: OBSTETRICS & GYNECOLOGY

## 2024-11-23 RX ORDER — HYDROCODONE BITARTRATE AND ACETAMINOPHEN 5; 325 MG/1; MG/1
1 TABLET ORAL EVERY 8 HOURS PRN
Qty: 5 TABLET | Refills: 0 | Status: SHIPPED | OUTPATIENT
Start: 2024-11-23

## 2024-11-23 RX ORDER — DOCUSATE SODIUM 100 MG/1
200 CAPSULE, LIQUID FILLED ORAL 2 TIMES DAILY
Qty: 120 CAPSULE | Refills: 0 | Status: SHIPPED | OUTPATIENT
Start: 2024-11-23 | End: 2024-12-23

## 2024-11-23 RX ORDER — IBUPROFEN 800 MG/1
800 TABLET ORAL EVERY 8 HOURS PRN
Qty: 40 TABLET | Refills: 0 | Status: SHIPPED | OUTPATIENT
Start: 2024-11-23

## 2024-11-23 RX ADMIN — IBUPROFEN 600 MG: 600 TABLET, FILM COATED ORAL at 05:11

## 2024-11-23 RX ADMIN — ACETAMINOPHEN 650 MG: 325 TABLET ORAL at 12:11

## 2024-11-23 RX ADMIN — ACETAMINOPHEN 650 MG: 325 TABLET ORAL at 11:11

## 2024-11-23 RX ADMIN — IBUPROFEN 600 MG: 600 TABLET, FILM COATED ORAL at 12:11

## 2024-11-23 RX ADMIN — DOCUSATE SODIUM 200 MG: 100 CAPSULE, LIQUID FILLED ORAL at 10:11

## 2024-11-23 NOTE — NURSING
Mom given d/c instructions in NICU. Parents state that they will leave from there when baby is discharged.

## 2024-11-23 NOTE — PROGRESS NOTES
[facility] ([facility])  Obstetrics  Postpartum Progress Note    Patient Name: Katelyn Pritchard  MRN: [id number]  Admission Date: 2024  Hospital Length of Stay: 3 days  Attending Physician: Floyd Edge MD  Primary Care Provider: Jason Mark MD    Subjective:     Principal Problem: (normal spontaneous vaginal delivery)    Hospital Course:  VE 6-7/80/-1  Admit to LD for labor  Patient desires NCB/tub   present and has signed agreement  Patient refused EFM, IV, and labs.    Reviewed policies and recommendations regarding EFM, IV, and labs. After in depth conversation patient will consider NST and intermittent monitoring as well as saline lock.  Will discuss with  and  and notify with decision.   Anticipate      24     0715 hrs  of viable female infant, Dr Francis repaired R sulcus tear, vaginal pack and webb inserted    1419 hrs-vag pack removed per CNM, small amt bleeding noted, webb removed 30 minutes after vag pack    1751 hrs-CNM called to room for syncopal episode, pt did not fall, nurse at bedside, lasted ~ 20 seconds, reviewed case w/ MD and rec 1 unit of PRBC's, pt agreed    24     PPD #1 S/P receiving 1 unit PRBC's, H&H this am 30.1 / 10.1.  Patient remains significantly symptomatic with + orthostatic VS.  Patient agrees to receive additional unit of PRBC's.  Orders placed. Orthostatic precautions and continue with routine PP care    PPD #2, pt is doing ok today, baby is in NICU d/t baby not voiding in 35 hrs per MB nurse, but baby has since voided and will be sent back to mom and dad then discharged home, pt states she feel like she has to have a BM but is afraid to due to laceration repairs, reassured pt, recommend stool softener bid, adequate fluid intake, will send motrin and Randolph to pharmacy, pt is also s/p 2 units of PRBC's, post transfusion CBC stable at 32.1/11, d/c home today w/ baby    Interval History: PPD 2    She is doing well this  morning. She is tolerating a regular diet without nausea or vomiting. She is voiding spontaneously. She is ambulating. She has passed flatus, and has not a BM. Pt is nervous about having a BM. Recommend stool softener BID. Vaginal bleeding is mild. She denies fever or chills. Abdominal pain is mild and controlled with oral medications. She Is breastfeeding. She desires circumcision for her male baby: not applicable.    Objective:     Vital Signs (Most Recent):  Temp: 98.6 °F (37 °C) (11/23/24 1236)  Pulse: 102 (11/23/24 1236)  Resp: 18 (11/23/24 1236)  BP: 123/69 (11/23/24 1236)  SpO2: 100 % (11/22/24 1545) Vital Signs (24h Range):  Temp:  [97.7 °F (36.5 °C)-99.1 °F (37.3 °C)] 98.6 °F (37 °C)  Pulse:  [] 102  Resp:  [14-18] 18  SpO2:  [100 %] 100 %  BP: (108-133)/(59-73) 123/69     Weight: 74.6 kg (164 lb 7.4 oz)  Body mass index is 25.01 kg/m².      Intake/Output Summary (Last 24 hours) at 11/23/2024 1328  Last data filed at 11/22/2024 1710  Gross per 24 hour   Intake 344.58 ml   Output --   Net 344.58 ml         Significant Labs:  Lab Results   Component Value Date    GROUPTRH A POS 11/21/2024    HEPBSAG Non-reactive 05/02/2024     Recent Labs   Lab 11/23/24  0953   HGB 11.0*   HCT 32.1*       I have personallly reviewed all pertinent lab results from the last 24 hours.  Recent Lab Results         11/23/24  0953        Baso # 0.08       Basophil % 0.6       Differential Method Automated       Eos # 0.2       Eos % 1.8       Gran # (ANC) 10.7       Gran % 79.0       Hematocrit 32.1       Hemoglobin 11.0       Immature Grans (Abs) 0.06  Comment: Mild elevation in immature granulocytes is non specific and   can be seen in a variety of conditions including stress response,   acute inflammation, trauma and pregnancy. Correlation with other   laboratory and clinical findings is essential.         Immature Granulocytes 0.4       Lymph # 1.7       Lymph % 12.9       MCH 32.0       MCHC 34.3       MCV 93       Mono #  0.7       Mono % 5.3       MPV 10.9       nRBC 0       Platelet Count 177       RBC 3.44       RDW 15.1       WBC 13.49               Physical Exam:   Constitutional: She is oriented to person, place, and time. She appears well-developed and well-nourished. She appears distressed.    HENT:   Head: Normocephalic.      Cardiovascular:  Normal rate.             Pulmonary/Chest: Effort normal.        Abdominal: Soft.     Genitourinary: There is vaginal discharge in the vagina.    Genitourinary Comments:  lochia small/mod per pt             Musculoskeletal: Normal range of motion and moves all extremeties.       Neurological: She is alert and oriented to person, place, and time.    Skin: Skin is warm and dry.    Psychiatric: She has a normal mood and affect. Her behavior is normal. Judgment and thought content normal.       Review of Systems   Eyes:  Negative for visual disturbance.   Gastrointestinal:  Positive for abdominal pain.   Genitourinary:  Positive for vaginal bleeding.   Neurological:  Negative for syncope and headaches.   All other systems reviewed and are negative.    Assessment/Plan:     29 y.o. female  for:    *  (normal spontaneous vaginal delivery)  Routine PP care    Single live birth  Care of infant per peds     Laceration of vaginal wall or sulcus without perineal laceration during delivery  Routine perineal care    Other immediate postpartum hemorrhage  24 Transfuse additional unit of PRBC's  Post transfusion CBC .1        Disposition: As patient meets milestones, will plan to discharge today.    Casimiro Lu CNM  Obstetrics  [facility] ([facility])

## 2024-11-23 NOTE — LACTATION NOTE
Mother feeding in a cross cradle hold position on the left breast. Baby unlatched and nipple shape and color WNLs. Reviewed deep asymmetric latch and proper positioning. Mother is able to demonstrate back. Latched baby to right breast in cross cradle hold. Infrequent swallows and choppy suck. Bottle lip tucked. Mother denies pain. Baby fed until content, and unlatched self. Nipple slightly blanched and compressed upon unlatching. Reviewed hand expression and nipple care; mother able to return back demonstration.      Hand expressed after, collected 0.5 mls colostrum in syringe for next feeding. Baby sleeping.

## 2024-11-23 NOTE — DISCHARGE INSTRUCTIONS
"Mother Self Care:    Activity: Avoid strenuous exercise and get adequate rest.  No driving until the physician consent given.  Emotional Changes: Most women find birth to be a time of great emotional upheaval.  Sense of loss, mood swings, fatigue, anxiety, and feeling "let down" are common.  If feelings worsen or last more than a week, call your physician.  Breast Care/Breastfeeding: Wear a bra for comfort.  Keep nipples dry and apply your own breast milk or lanolin cream as needed for soreness.  Engorgement can be relieved with warm, moist heat before feedings.  You may also take Ibuprofen.  Breast Care/Bottle Feeding: Wear support bra 24 hours a day for one week.  Avoid stimulation to breasts.  You may use ice packs for discomfort.  Yue-Care/Vaginal Bleeding: Remember to use your yue-bottle after urinating.  Your flow will change from red, to pink, to yellow/white color over a period of 2 weeks.  Menstruation will return in 3-8 weeks, or longer if breastfeeding.  Episiotomy Vaginal Delivery: Stitches will dissolve within 10 days to 3 weeks.  Warm baths, tucks, and dermoplast will promote healing.  Avoid bubble baths or strong soaps.   Section/Tubal Ligation: Keep incision clean and dry. You may shower, but avoid baths. Please continue to use Nozin twice a day for 7 days after surgery. Ensure you attend your 1 week post op visit to have your dressing removed. Use antibacterial soap to wash your entire body until directed otherwise by a Physician, it is very important to shower daily. If you become concerned about the way your incision site looks, please contact your providers office.   Sexual Activity/Pelvic Rest: No sexual activity, tampons, or douching until your physician gives you consent.  Diet: Continue to eat from the five basic food groups, including plenty of protein, fruits, vegetables, and whole grains.  Limit empty calories and high fat foods.  Drink enough fluids to satisfy thirst and add an " "extra 500 calories for breastfeeding.  Constipation/Hemorrhoids: Drink plenty of water.  You may take a stool softener or natural laxative (Metamucil). You may use tucks or hemorrhoid ointment and soak in a warm tub.    "What does help look like to you when you go home?"  "Is there any need that you anticipate that we may be able to assist with?"    CALL YOUR OB DOCTOR IF ANY OF THE FOLLOWING OCCURS:  *Heavy bleeding - saturating a pad an hour or passing any large (2-3 inches in size) blood clots.  *Any pain, redness, or tenderness in lower leg.  *You cannot care for yourself or your baby.  *Any signs of infection-      - Temperature greater than 100.5 degrees F      - Foul smelling vaginal discharge and/or incisional drainage      - Increased episiotomy or incisional pain      - Hot, hard, red or sore area on breast      - Flu-like symptoms      - Any urgency, frequency or burning with urination    Return To the Hospital for further Evaluation:  Headache not relieved by tylenol or ibuprofen  Blurry vision, double vision, seeing spots, or flashing lights  Feeling faint or passing out  Right epigastric pain  Difficulty breathing  Swelling in hands, face, or feet  Any of these symptoms accompanied by nausea/vomiting  Gaining more than 5 pounds in one week  Seizures  These symptoms could be an indication of elevated blood pressure.     For patients that were treated for high blood pressure during pregnancy and or your hospital stay you will need a blood pressure check three days after you leave the hospital. Your nursing staff will assist you in an appointment if needed. If you have Connected Mom you may use your blood pressure cuff and report any readings 140/90 to your provider immediately.       If you have any questions that need to be answered immediately please call the Labor & Delivery Unit at [phone] and ask to speak to a nurse.      Please see [facility] BLUE folder for additional information and handouts. "

## 2024-11-23 NOTE — PLAN OF CARE
Problem: Adult Inpatient Plan of Care  Goal: Plan of Care Review  2024 1059 by Chelo Huff LPN  Outcome: Met  2024 1059 by Chelo Huff LPN  Outcome: Progressing  Goal: Patient-Specific Goal (Individualized)  2024 1059 by Chelo Huff LPN  Outcome: Met  2024 1059 by Chelo Huff LPN  Outcome: Progressing  Goal: Absence of Hospital-Acquired Illness or Injury  2024 1059 by Chelo Huff LPN  Outcome: Met  2024 1059 by Chelo Huff LPN  Outcome: Progressing  Goal: Optimal Comfort and Wellbeing  2024 1059 by Chelo Huff LPN  Outcome: Met  2024 1059 by Chelo Huff LPN  Outcome: Progressing  Goal: Readiness for Transition of Care  2024 1059 by Chelo Huff LPN  Outcome: Met  2024 1059 by Chelo Huff LPN  Outcome: Progressing     Problem:  Fall Injury Risk  Goal: Absence of Fall, Infant Drop and Related Injury  2024 1059 by Chelo Huff LPN  Outcome: Met  2024 1059 by Chelo Huff LPN  Outcome: Progressing     Problem: Infection  Goal: Absence of Infection Signs and Symptoms  2024 1059 by Chelo Huff LPN  Outcome: Met  2024 1059 by Chelo Huff LPN  Outcome: Progressing     Problem: Skin Injury Risk Increased  Goal: Skin Health and Integrity  2024 1059 by Chelo Huff LPN  Outcome: Met  2024 1059 by Chelo Huff LPN  Outcome: Progressing     Problem: Postpartum (Vaginal Delivery)  Goal: Successful Parent Role Transition  2024 1059 by Chelo Huff LPN  Outcome: Met  2024 1059 by Chelo Huff LPN  Outcome: Progressing  Goal: Hemostasis  2024 1059 by Chelo Huff LPN  Outcome: Met  2024 1059 by Chelo Huff LPN  Outcome: Progressing  Goal: Absence of Infection Signs and Symptoms  2024 1059 by Chelo Huff LPN  Outcome: Met  2024 105 by Chelo Huff LPN  Outcome: Progressing  Goal: Anesthesia/Sedation Recovery  2024 105 by Daria,  JAC Whitney  Outcome: Met  11/23/2024 1059 by Chelo Huff LPN  Outcome: Progressing  Goal: Optimal Pain Control and Function  11/23/2024 1059 by Chelo Huff LPN  Outcome: Met  11/23/2024 1059 by Chelo Huff LPN  Outcome: Progressing  Goal: Effective Urinary Elimination  11/23/2024 1059 by Chelo Huff LPN  Outcome: Met  11/23/2024 1059 by Chelo uHff LPN  Outcome: Progressing

## 2024-11-23 NOTE — SUBJECTIVE & OBJECTIVE
Interval History: PPD 2    She is doing well this morning. She is tolerating a regular diet without nausea or vomiting. She is voiding spontaneously. She is ambulating. She has passed flatus, and has not a BM. Pt is nervous about having a BM. Recommend stool softener BID. Vaginal bleeding is mild. She denies fever or chills. Abdominal pain is mild and controlled with oral medications. She Is breastfeeding. She desires circumcision for her male baby: not applicable.    Objective:     Vital Signs (Most Recent):  Temp: 98.6 °F (37 °C) (11/23/24 1236)  Pulse: 102 (11/23/24 1236)  Resp: 18 (11/23/24 1236)  BP: 123/69 (11/23/24 1236)  SpO2: 100 % (11/22/24 1545) Vital Signs (24h Range):  Temp:  [97.7 °F (36.5 °C)-99.1 °F (37.3 °C)] 98.6 °F (37 °C)  Pulse:  [] 102  Resp:  [14-18] 18  SpO2:  [100 %] 100 %  BP: (108-133)/(59-73) 123/69     Weight: 74.6 kg (164 lb 7.4 oz)  Body mass index is 25.01 kg/m².      Intake/Output Summary (Last 24 hours) at 11/23/2024 1328  Last data filed at 11/22/2024 1710  Gross per 24 hour   Intake 344.58 ml   Output --   Net 344.58 ml         Significant Labs:  Lab Results   Component Value Date    GROUPTRH A POS 11/21/2024    HEPBSAG Non-reactive 05/02/2024     Recent Labs   Lab 11/23/24  0953   HGB 11.0*   HCT 32.1*       I have personallly reviewed all pertinent lab results from the last 24 hours.  Recent Lab Results         11/23/24  0953        Baso # 0.08       Basophil % 0.6       Differential Method Automated       Eos # 0.2       Eos % 1.8       Gran # (ANC) 10.7       Gran % 79.0       Hematocrit 32.1       Hemoglobin 11.0       Immature Grans (Abs) 0.06  Comment: Mild elevation in immature granulocytes is non specific and   can be seen in a variety of conditions including stress response,   acute inflammation, trauma and pregnancy. Correlation with other   laboratory and clinical findings is essential.         Immature Granulocytes 0.4       Lymph # 1.7       Lymph % 12.9        MCH 32.0       MCHC 34.3       MCV 93       Mono # 0.7       Mono % 5.3       MPV 10.9       nRBC 0       Platelet Count 177       RBC 3.44       RDW 15.1       WBC 13.49               Physical Exam:   Constitutional: She is oriented to person, place, and time. She appears well-developed and well-nourished. She appears distressed.    HENT:   Head: Normocephalic.      Cardiovascular:  Normal rate.             Pulmonary/Chest: Effort normal.        Abdominal: Soft.     Genitourinary: There is vaginal discharge in the vagina.    Genitourinary Comments:  lochia small/mod per pt             Musculoskeletal: Normal range of motion and moves all extremeties.       Neurological: She is alert and oriented to person, place, and time.    Skin: Skin is warm and dry.    Psychiatric: She has a normal mood and affect. Her behavior is normal. Judgment and thought content normal.       Review of Systems   Eyes:  Negative for visual disturbance.   Gastrointestinal:  Positive for abdominal pain.   Genitourinary:  Positive for vaginal bleeding.   Neurological:  Negative for syncope and headaches.   All other systems reviewed and are negative.

## 2024-11-23 NOTE — LACTATION NOTE
Lactation Rounds: Mother states that she is getting 10-15 mL of EBM every pump session as of this AM. Mother has no concerns with pumping at this time. Breastfeeding discharge education performed. Informed mother of the World Health Organization's recommendation for exclusive breastfeeding for the first 6 months of baby's life and continued breastfeeding after the introduction of solid foods for 2 years and beyond.     Reviewed proper usage and to adjust suction according to comfort level. Reviewed with mother frequency and duration of pumping in order to promote and maintain full milk supply. Hands on pumping technique reviewed. . Instructed mother on cleaning of breast pump parts. Reviewed proper milk handling, collection, storage, and transportation. Voices understanding.     Written instructions have been provided and were reviewed at this time. Hand expression reviewed, mother able to return demonstrate. Lactation discharge booklet reviewed.    Instruct the mother to:  Sit upright and lean forward if possible.  Apply warm, wet baby blanket/towel over breasts for a few minutes followed by gentle breast massage.  Form a C with her hand and place it about 1 inch back from the areola with the nipple centered between her thumb and index finger.  Press, compress, relax :  apply pressure in an inward direction toward the breast without stretching the tissue and then compress the breast tissue between her  fingers for a few minutes.  Rotate placement of fingers on the breasts to facilitate emptying.  Collect expressed colostrum/ human milk with a spoon and feed immediately to the baby or place it directly into a sterile storage container for later use.  If stored for later use, place the babys breastmilk label (with the date and time of collection and the names of meds she is taking) on  the container.  Place the container  immediately  into the breastmilk refrigerator or freezer for later use.     Reviewed breast  massage and compression during pumping and indications for use. Reviewed signs of engorgement and expectant management. Reviewed signs of mastitis and instructed mother to call OB provider and lactation if any signs present. Discussed proper use of First Alert Form. Reviewed nursing diet and nutrition. Discussed resources for medication safety while breastfeeding. Reviewed available outpatient lactation resources.     Mother is aware of warm line, outpatient consultations, community resources and monthly support groups. Encouraged mother to contact lactation with any questions, concerns, or problems. Contact numbers provided, and mother verbalizes understanding.

## 2024-11-23 NOTE — DISCHARGE SUMMARY
[facility]  Obstetrics  Discharge Summary      Patient Name: Katelyn Pritchard  MRN: [id number]  Admission Date: 2024  Hospital Length of Stay: 3 days  Discharge Date and Time: No discharge date for patient encounter.  Attending Physician: Floyd Edge MD   Discharging Provider: Casimiro Lu CNM   Primary Care Provider: Jason Mark MD    HPI: 29 y.o.  40w2d with c/o contractions since 0300 yesterday morning that have gotten closer together this afternoon           * No surgery found *     Hospital Course:   VE 6-/-1  Admit to LD for labor  Patient desires NCB/tub   present and has signed agreement  Patient refused EFM, IV, and labs.    Reviewed policies and recommendations regarding EFM, IV, and labs. After in depth conversation patient will consider NST and intermittent monitoring as well as saline lock.  Will discuss with  and  and notify with decision.   Anticipate      24     0715 hrs  of viable female infant, Dr Francis repaired R sulcus tear, vaginal pack and webb inserted    1419 hrs-vag pack removed per CNM, small amt bleeding noted, webb removed 30 minutes after vag pack    1751 hrs-CNM called to room for syncopal episode, pt did not fall, nurse at bedside, lasted ~ 20 seconds, reviewed case w/ MD and rec 1 unit of PRBC's, pt agreed    24     PPD #1 S/P receiving 1 unit PRBC's, H&H this am 30.1 / 10.1.  Patient remains significantly symptomatic with + orthostatic VS.  Patient agrees to receive additional unit of PRBC's.  Orders placed. Orthostatic precautions and continue with routine PP care    PPD #2, pt is doing ok today, baby is in NICU d/t baby not voiding in 35 hrs per MB nurse, but baby has since voided and will be sent back to mom and dad then discharged home, pt states she feel like she has to have a BM but is afraid to due to laceration repairs, reassured pt, recommend stool softener bid, adequate fluid intake,  "will send motrin and Norco to pharmacy, pt is also s/p 2 units of PRBC's, post transfusion CBC stable at 32.1/11, d/c home today w/ baby         Final Active Diagnoses:    Diagnosis Date Noted POA    PRINCIPAL PROBLEM:   (normal spontaneous vaginal delivery) [O80] 2024 Not Applicable    Other immediate postpartum hemorrhage [O72.1] 2024 No    Laceration of vaginal wall or sulcus without perineal laceration during delivery [O71.4] 2024 No    Single live birth [Z37.0] 2024 Not Applicable      Problems Resolved During this Admission:        Significant Diagnostic Studies: Labs: CBC   Recent Labs   Lab 24  1642 24  0657 24  0953   WBC 11.66 12.34 13.49*   HGB 8.5* 10.1* 11.0*   HCT 24.7* 30.1* 32.1*   * 140* 177    and All labs within the past 24 hours have been reviewed      Feeding Method: breast    Immunizations       Date Immunization Status Dose Route/Site Given by    24 1454 MMR Incomplete 0.5 mL Subcutaneous/     24 1454 Tdap Incomplete 0.5 mL Intramuscular/             Delivery:    Episiotomy: None   Lacerations: None   Repair suture:     Repair # of packets: 5   Blood loss (ml):       Birth information:  YOB: 2025   Time of birth: 7:15 AM   Sex: female   Delivery type: Vaginal, Spontaneous   Gestational Age: 40w3d     Measurements    Weight: 3510 g  Weight (lbs): 7 lb 11.8 oz  Length: 53 cm  Length (in): 20.87"  Head circumference: 36 cm  Chest circumference: 33.5 cm  Abdominal girth: 31 cm         Delivery Clinician: Delivery Providers    Delivering clinician: Yue Ritchie CNM          Additional  information:  Forceps:    Vacuum:    Breech:    Observed anomalies      Living?:     Apgars    Living status: Living  Apgar Component Scores:  1 min.:  5 min.:  10 min.:  15 min.:  20 min.:    Skin color:  0  1  1      Heart rate:  2  2  2      Reflex irritability:  2  2  2      Muscle tone:  0  1  2      Respiratory effort:  1  " 2  2      Total:  5  8  9      Apgars assigned by: VIRGINIA VELEZ/KATINA COLEMAN         Placenta: Delivered:       appearance  Pending Diagnostic Studies:       None            Discharged Condition: stable    Disposition: Home or Self Care    Follow Up:   Follow-up Information       Gayathri Hernandez CNM Follow up.    Specialty: Obstetrics and Gynecology  Why: PP f/u  Contact information:  [address] [place] [address] DR Doreen DEE [address] [phone]                           Patient Instructions:      Diet Adult Regular     Pelvic Rest     No driving until:   Order Comments: 1-2 wks     Notify your health care provider if you experience any of the following:  temperature >100.4     Notify your health care provider if you experience any of the following:  severe uncontrolled pain     Notify your health care provider if you experience any of the following:  redness, tenderness, or signs of infection (pain, swelling, redness, odor or green/yellow discharge around incision site)     Notify your health care provider if you experience any of the following:  severe persistent headache     Notify your health care provider if you experience any of the following:  persistent dizziness, light-headedness, or visual disturbances     Medications:  Current Discharge Medication List        START taking these medications    Details   docusate sodium (COLACE) 100 MG capsule Take 2 capsules (200 mg total) by mouth 2 (two) times daily.  Qty: 120 capsule, Refills: 0      HYDROcodone-acetaminophen (NORCO) 5-325 mg per tablet Take 1 tablet by mouth every 8 (eight) hours as needed for Pain.  Qty: 5 tablet, Refills: 0    Comments: Quantity prescribed more than 7 day supply? No  Associated Diagnoses:  (normal spontaneous vaginal delivery)      ibuprofen (ADVIL,MOTRIN) 800 MG tablet Take 1 tablet (800 mg total) by mouth every 8 (eight) hours as needed for Pain.  Qty: 40 tablet, Refills: 0             Casimiro Lu,  CNM  Obstetrics  [facility])

## 2024-11-23 NOTE — LACTATION NOTE
[employer] Symphony breast pump set up at bedside.  Instructed on proper usage and to adjust suction according to comfort level. 21 mm flange used. Reviewed frequency and duration of pumping in order to promote and maintain full milk supply. Hands-on pumping technique reviewed. Collected 1 ml colostrum for next feeding. Encouraged hand expression after. Instructed on proper cleaning of breast pump parts and washed and set out to air dry. Reviewed proper milk handling, collection, storage, and transportation. Triple feeding plan started. Discussed with parents and provided with written plan. Voices understanding.

## 2024-11-26 ENCOUNTER — NURSE TRIAGE (OUTPATIENT)
Dept: ADMINISTRATIVE | Facility: CLINIC | Age: 29
End: 2024-11-26
Payer: COMMERCIAL

## 2024-11-26 NOTE — TELEPHONE ENCOUNTER
Initially called looking for a midwife    Postpartum  Says she hemorrhaged when delivering on 11/21; did pass out in hospital after birth      Lightheaded, SOB at rest  Able to stand, able to walk without assistance  Does report that when walking, she feels like HR is increased but denies that heart is racing or >140BPM.    Advised pt to go to ER now; pt reports that her  can drive her to ER.    After giving dispo, pt reports burning with urination. Advised pt that this can be addressed in ER as well. Pt VU.     Reason for Disposition   MODERATE difficulty breathing (e.g., speaks in phrases, SOB even at rest, pulse 100-120) of new-onset or worse than normal    Additional Information   Negative: SEVERE difficulty breathing (e.g., struggling for each breath, speaks in single words, pulse > 120)   Negative: Breathing stopped and hasn't returned   Negative: Choking on something   Negative: Bluish (or gray) lips or face   Negative: Difficult to awaken or acting confused (e.g., disoriented, slurred speech)   Negative: Passed out (e.g., fainted, lost consciousness, blacked out and was not responding)   Negative: Wheezing started suddenly after medicine, an allergic food, or bee sting   Negative: Stridor (harsh sound while breathing in)   Negative: Slow, shallow and weak breathing   Negative: Sounds like a life-threatening emergency to the triager   Negative: SEVERE difficulty breathing (e.g., struggling for each breath, speaks in single words)   Negative: Shock suspected (e.g., cold/pale/clammy skin, too weak to stand, low BP, rapid pulse)   Negative: Difficult to awaken or acting confused (e.g., disoriented, slurred speech)   Negative: Fainted, and still feels dizzy afterwards   Negative: Overdose (accidental or intentional) of medications   Negative: New neurologic deficit that is present now: * Weakness of the face, arm, or leg on one side of the body * Numbness of the face, arm, or leg on one side of the body *  Loss of speech or garbled speech   Negative: Heart beating < 50 beats per minute OR > 140 beats per minute   Negative: Sounds like a life-threatening emergency to the triager   Negative: Passed out (e.g., fainted, lost consciousness, blacked out and was not responding)   Negative: Shock suspected (e.g., cold/pale/clammy skin, too weak to stand, low BP, rapid pulse)   Negative: Difficult to awaken or acting confused (e.g., disoriented, slurred speech)   Negative: Visible sweat on face or sweat dripping down face   Negative: Unable to walk, or can only walk with assistance (e.g., requires support)   Negative: Received SHOCK from implantable cardiac defibrillator and has persisting symptoms (i.e., palpitations, lightheadedness)   Negative: Feeling weak or lightheaded (e.g., woozy, feeling like they might faint) AND heart beating very rapidly (e.g., > 140 / minute)   Negative: Feeling weak or lightheaded (e.g., woozy, feeling like they might faint) AND heart beating very slowly (e.g., < 50 / minute)   Negative: Sounds like a life-threatening emergency to the triager    Protocols used: Breathing Difficulty-A-OH, Dizziness-A-OH, Heart Rate and Heartbeat Ldmbtiytn-I-AT

## 2024-12-19 ENCOUNTER — POSTPARTUM VISIT (OUTPATIENT)
Dept: OBSTETRICS AND GYNECOLOGY | Facility: CLINIC | Age: 29
End: 2024-12-19
Payer: COMMERCIAL

## 2024-12-19 VITALS
BODY MASS INDEX: 21.8 KG/M2 | WEIGHT: 143.88 LBS | SYSTOLIC BLOOD PRESSURE: 102 MMHG | DIASTOLIC BLOOD PRESSURE: 64 MMHG | HEIGHT: 68 IN

## 2024-12-19 PROCEDURE — 99999 PR PBB SHADOW E&M-EST. PATIENT-LVL III: CPT | Mod: PBBFAC,,, | Performed by: MIDWIFE

## 2024-12-19 PROCEDURE — 0503F POSTPARTUM CARE VISIT: CPT | Mod: CPTII,S$GLB,, | Performed by: MIDWIFE

## 2024-12-19 NOTE — PROGRESS NOTES
"Subjective:       Katelyn Pritchard is a 29 y.o. female who presents for a postpartum visit. She is 4 weeks postpartum following a spontaneous vaginal delivery. I have fully reviewed the prenatal and intrapartum course. The delivery was at 40w3d gestational weeks. Outcome: spontaneous vaginal delivery. Anesthesia: epidural. Postpartum course has been uncomplicated. Baby's course has been uncomplicated. Baby is feeding by breast. Bleeding no bleeding. Bowel function is normal. Bladder function is normal. Patient is not sexually active. Contraception method is none. Postpartum depression screening: negative.    The following portions of the patient's history were reviewed and updated as appropriate: allergies, current medications, past family history, past medical history, past social history, past surgical history, and problem list.    Review of Systems  A comprehensive review of systems was negative.     Objective:      /64   Ht 5' 8" (1.727 m)   Wt 65.2 kg (143 lb 13.6 oz)   LMP 02/12/2024 (Exact Date)   Breastfeeding No   BMI 21.87 kg/m²    General:  alert, appears stated age, and cooperative               Abdomen: soft, non-tender; bowel sounds normal; no masses,  no organomegaly    Vulva:  normal   Vagina: normal vagina, granulation tissue noted, no palpable suture, healing well        Corpus: normal   Adnexa:  normal adnexa   Rectal Exam: Not performed.          Assessment:      Routine postpartum exam. Pap smear not done at today's visit. Last pap smear 10/2023 NILM    Plan:      1. Contraception: none, declines  2. Continue pelvic rest for 2 more weeks  3. Follow up in: 1  year for annual  or as needed.     "

## 2025-04-04 ENCOUNTER — TELEPHONE (OUTPATIENT)
Dept: OBSTETRICS AND GYNECOLOGY | Facility: CLINIC | Age: 30
End: 2025-04-04
Payer: COMMERCIAL

## 2025-04-04 NOTE — TELEPHONE ENCOUNTER
Phoned patient to inform of being outside of postpartum window and possibly rescheduling her appointment, unsuccessful, no reply, left voice message stating what call is regarding and to give us a call back at [phone].

## 2025-04-04 NOTE — TELEPHONE ENCOUNTER
Spoke with patient. Rescheduled pt with Dr. Eid for vaginal discomfort on 4/10 at 9:45. Pt verbalized understanding and agreed to appointment.

## 2025-04-04 NOTE — TELEPHONE ENCOUNTER
----- Message from Jaye sent at 4/4/2025  3:54 PM CDT -----  Type:  Patient Returning CallWho Called:Katelyn Medina Left Message for Patient: Angeline Does the patient know what this is regarding?:RS appt Would the patient rather a call back or a response via [facility]?  Best Call Back Number:.[phone] Information: LUZ

## 2025-04-10 ENCOUNTER — OFFICE VISIT (OUTPATIENT)
Dept: OBSTETRICS AND GYNECOLOGY | Facility: CLINIC | Age: 30
End: 2025-04-10
Payer: COMMERCIAL

## 2025-04-10 VITALS
WEIGHT: 139.88 LBS | SYSTOLIC BLOOD PRESSURE: 108 MMHG | DIASTOLIC BLOOD PRESSURE: 70 MMHG | BODY MASS INDEX: 21.2 KG/M2 | HEIGHT: 68 IN

## 2025-04-10 DIAGNOSIS — Z12.4 SCREENING FOR CERVICAL CANCER: ICD-10-CM

## 2025-04-10 DIAGNOSIS — Z01.419 ENCOUNTER FOR GYNECOLOGICAL EXAMINATION (GENERAL) (ROUTINE) WITHOUT ABNORMAL FINDINGS: Primary | ICD-10-CM

## 2025-04-10 DIAGNOSIS — N92.6 IRREGULAR BLEEDING: ICD-10-CM

## 2025-04-10 DIAGNOSIS — N89.8 VAGINAL DISCHARGE: ICD-10-CM

## 2025-04-10 PROCEDURE — 99999 PR PBB SHADOW E&M-EST. PATIENT-LVL III: CPT | Mod: PBBFAC,,, | Performed by: OBSTETRICS & GYNECOLOGY

## 2025-04-10 NOTE — PROGRESS NOTES
Subjective:       Patient ID: Katelyn Pritchard is a 29 y.o. female.    Chief Complaint:  Annual Exam      History of Present Illness  HPI  Annual Exam-Premenopausal  Patient presents for annual exam. The patient reports vaginal discharge and odor;  The patient is sexually active.--breast feeding, no contraception;  GYN screening history: last pap: approximate date 10/2023 and was normal. The patient wears seatbelts: yes. The patient participates in regular exercise: yes.--cardio/strengh 2-3 d/wk;  Has the patient ever been transfused or tattooed?: yes.--transfused after delviery;  The patient reports that there is not domestic violence in her life.  Reports irreg bleeding since delivery; --panti liner ; random spotting  Denies urinary leakage    GYN & OB History  Patient's last menstrual period was 2025 (approximate).   Date of Last Pap: 10/23/2023    OB History    Para Term  AB Living   1 1 1 0 0 1   SAB IAB Ectopic Multiple Live Births   0 0 0 0 1      # Outcome Date GA Lbr Maximus/2nd Weight Sex Type Anes PTL Lv   1 Term 24 40w3d / 02:34 3.51 kg (7 lb 11.8 oz) F Vag-Spont EPI N RUFUS      Complications: Other Excessive Bleeding       Review of Systems  Review of Systems   Genitourinary:  Positive for vaginal discharge and vaginal odor.   All other systems reviewed and are negative.          Objective:      Physical Exam:   Constitutional: She is oriented to person, place, and time. She appears well-developed and well-nourished.     Eyes: Pupils are equal, round, and reactive to light. Conjunctivae and EOM are normal.      Pulmonary/Chest: Effort normal. Right breast exhibits no mass, no nipple discharge, no skin change and no tenderness. Left breast exhibits no mass, no nipple discharge, no skin change and no tenderness. Breasts are symmetrical.        Abdominal: Soft.     Genitourinary:    Inguinal canal, urethra, bladder, vagina, uterus, right adnexa, left adnexa and rectum normal.      Pelvic  exam was performed with patient supine.   The external female genitalia was normal.     Labial bartholins normal.Cervix is normal. Right adnexum displays no mass and no tenderness. Left adnexum displays no mass and no tenderness. No erythema, vaginal discharge, bleeding, rectocele, cystocele or prolapse of vaginal walls in the vagina. Vaginal atrophy noted.    pap smear not completedUerus contour normal  Normal urethral meatus.Urethra findings: no urethral massBladder findings: no bladder distention and no bladder tenderness          Musculoskeletal: Normal range of motion and moves all extremeties.       Neurological: She is alert and oriented to person, place, and time.    Skin: Skin is warm.    Psychiatric: She has a normal mood and affect. Her behavior is normal.           Assessment:     Encounter Diagnoses   Name Primary?    Vaginal discharge     Irregular bleeding     Encounter for gynecological examination (general) (routine) without abnormal findings Yes    Screening for cervical cancer            Plan:      Continue annual well woman exam.  Pap 2023 due in 2026; Reviewed updated recommendations for pap smears (every 3 years) in low risk patients.   Recommend annual pelvic exams.  Reviewed recommendations for annual CBE.  Vaginitis today  If vaginitis negative consider vaginal mosturizer vs short course of vaginal estrogen  If irreg bleeding persists, consider pelvic sono--delcines at this point  Continue diet, exercise, weight loss  Continue daily prenatal vit

## 2025-04-15 ENCOUNTER — RESULTS FOLLOW-UP (OUTPATIENT)
Dept: OBSTETRICS AND GYNECOLOGY | Facility: HOSPITAL | Age: 30
End: 2025-04-15